# Patient Record
Sex: MALE | Race: OTHER
[De-identification: names, ages, dates, MRNs, and addresses within clinical notes are randomized per-mention and may not be internally consistent; named-entity substitution may affect disease eponyms.]

---

## 2020-03-18 ENCOUNTER — HOSPITAL ENCOUNTER (INPATIENT)
Dept: HOSPITAL 15 - ER | Age: 50
LOS: 9 days | Discharge: HOME | DRG: 951 | End: 2020-03-27
Attending: INTERNAL MEDICINE | Admitting: NURSE PRACTITIONER
Payer: MEDICAID

## 2020-03-18 VITALS — WEIGHT: 145.95 LBS | BODY MASS INDEX: 24.32 KG/M2 | HEIGHT: 65 IN

## 2020-03-18 VITALS — SYSTOLIC BLOOD PRESSURE: 99 MMHG | DIASTOLIC BLOOD PRESSURE: 59 MMHG

## 2020-03-18 DIAGNOSIS — J94.2: Primary | ICD-10-CM

## 2020-03-18 DIAGNOSIS — K72.90: ICD-10-CM

## 2020-03-18 DIAGNOSIS — K70.31: ICD-10-CM

## 2020-03-18 DIAGNOSIS — D68.9: ICD-10-CM
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
## 2020-03-18 DIAGNOSIS — K76.6: ICD-10-CM

## 2020-03-18 DIAGNOSIS — S21.101A: ICD-10-CM

## 2020-03-18 DIAGNOSIS — J98.11: ICD-10-CM

## 2020-03-18 DIAGNOSIS — E80.6: ICD-10-CM

## 2020-03-18 DIAGNOSIS — E87.1: ICD-10-CM
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
## 2020-03-18 DIAGNOSIS — D64.9: ICD-10-CM

## 2020-03-18 DIAGNOSIS — D69.6: ICD-10-CM

## 2020-03-18 DIAGNOSIS — Z79.899: ICD-10-CM

## 2020-03-18 DIAGNOSIS — K80.20: ICD-10-CM
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
## 2020-03-18 DIAGNOSIS — K70.30: ICD-10-CM

## 2020-03-18 DIAGNOSIS — J93.9: ICD-10-CM

## 2020-03-18 DIAGNOSIS — I95.9: ICD-10-CM

## 2020-03-18 DIAGNOSIS — D64.89: ICD-10-CM

## 2020-03-18 DIAGNOSIS — J96.00: ICD-10-CM

## 2020-03-18 DIAGNOSIS — N17.9: ICD-10-CM

## 2020-03-18 DIAGNOSIS — E44.0: ICD-10-CM

## 2020-03-18 DIAGNOSIS — I95.89: ICD-10-CM
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
## 2020-03-18 LAB
ALBUMIN SERPL-MCNC: 2.1 G/DL (ref 3.4–5)
ALP SERPL-CCNC: 214 U/L (ref 45–117)
ALT SERPL-CCNC: 34 U/L (ref 16–61)
ANION GAP SERPL CALCULATED.3IONS-SCNC: 7 MMOL/L (ref 5–15)
APTT PPP: 39.3 SEC (ref 23.64–32.05)
APTT PPP: 46.5 SEC (ref 23.64–32.05)
BILIRUB SERPL-MCNC: 6.1 MG/DL (ref 0.2–1)
BUN SERPL-MCNC: 12 MG/DL (ref 7–18)
BUN/CREAT SERPL: 19.7
CALCIUM SERPL-MCNC: 8.5 MG/DL (ref 8.5–10.1)
CHLORIDE SERPL-SCNC: 102 MMOL/L (ref 98–107)
CO2 SERPL-SCNC: 21 MMOL/L (ref 21–32)
GLUCOSE SERPL-MCNC: 82 MG/DL (ref 74–106)
HCT VFR BLD AUTO: 28 % (ref 41–53)
HCT VFR BLD AUTO: 33.2 % (ref 41–53)
HGB BLD-MCNC: 11.6 G/DL (ref 13.5–17.5)
HGB BLD-MCNC: 9.7 G/DL (ref 13.5–17.5)
INR PPP: 1.97 (ref 0.9–1.15)
INR PPP: 2.1 (ref 0.9–1.15)
LACTATE PLASV-SCNC: 2.8 MMOL/L (ref 0.4–2)
MAGNESIUM SERPL-MCNC: 2.3 MG/DL (ref 1.6–2.6)
MCH RBC QN AUTO: 36.7 PG (ref 28–32)
MCV RBC AUTO: 105 FL (ref 80–100)
NRBC BLD QL AUTO: 0.3 %
POTASSIUM SERPL-SCNC: 4.7 MMOL/L (ref 3.5–5.1)
PROT SERPL-MCNC: 7.3 G/DL (ref 6.4–8.2)
SODIUM SERPL-SCNC: 130 MMOL/L (ref 136–145)

## 2020-03-18 PROCEDURE — 87040 BLOOD CULTURE FOR BACTERIA: CPT

## 2020-03-18 PROCEDURE — 96365 THER/PROPH/DIAG IV INF INIT: CPT

## 2020-03-18 PROCEDURE — 86850 RBC ANTIBODY SCREEN: CPT

## 2020-03-18 PROCEDURE — 71045 X-RAY EXAM CHEST 1 VIEW: CPT

## 2020-03-18 PROCEDURE — 85027 COMPLETE CBC AUTOMATED: CPT

## 2020-03-18 PROCEDURE — 93005 ELECTROCARDIOGRAM TRACING: CPT

## 2020-03-18 PROCEDURE — 36415 COLL VENOUS BLD VENIPUNCTURE: CPT

## 2020-03-18 PROCEDURE — 86920 COMPATIBILITY TEST SPIN: CPT

## 2020-03-18 PROCEDURE — 80053 COMPREHEN METABOLIC PANEL: CPT

## 2020-03-18 PROCEDURE — 97116 GAIT TRAINING THERAPY: CPT

## 2020-03-18 PROCEDURE — 0XQ40ZZ REPAIR RIGHT AXILLA, OPEN APPROACH: ICD-10-PCS | Performed by: EMERGENCY MEDICINE

## 2020-03-18 PROCEDURE — 84484 ASSAY OF TROPONIN QUANT: CPT

## 2020-03-18 PROCEDURE — 71250 CT THORAX DX C-: CPT

## 2020-03-18 PROCEDURE — 85014 HEMATOCRIT: CPT

## 2020-03-18 PROCEDURE — 32551 INSERTION OF CHEST TUBE: CPT

## 2020-03-18 PROCEDURE — 84100 ASSAY OF PHOSPHORUS: CPT

## 2020-03-18 PROCEDURE — 86901 BLOOD TYPING SEROLOGIC RH(D): CPT

## 2020-03-18 PROCEDURE — 97530 THERAPEUTIC ACTIVITIES: CPT

## 2020-03-18 PROCEDURE — 85610 PROTHROMBIN TIME: CPT

## 2020-03-18 PROCEDURE — 85025 COMPLETE CBC W/AUTO DIFF WBC: CPT

## 2020-03-18 PROCEDURE — 97163 PT EVAL HIGH COMPLEX 45 MIN: CPT

## 2020-03-18 PROCEDURE — 96375 TX/PRO/DX INJ NEW DRUG ADDON: CPT

## 2020-03-18 PROCEDURE — 85730 THROMBOPLASTIN TIME PARTIAL: CPT

## 2020-03-18 PROCEDURE — 0W9930Z DRAINAGE OF RIGHT PLEURAL CAVITY WITH DRAINAGE DEVICE, PERCUTANEOUS APPROACH: ICD-10-PCS | Performed by: EMERGENCY MEDICINE

## 2020-03-18 PROCEDURE — 80048 BASIC METABOLIC PNL TOTAL CA: CPT

## 2020-03-18 PROCEDURE — 83605 ASSAY OF LACTIC ACID: CPT

## 2020-03-18 PROCEDURE — 96361 HYDRATE IV INFUSION ADD-ON: CPT

## 2020-03-18 PROCEDURE — 82140 ASSAY OF AMMONIA: CPT

## 2020-03-18 PROCEDURE — 82565 ASSAY OF CREATININE: CPT

## 2020-03-18 PROCEDURE — 85007 BL SMEAR W/DIFF WBC COUNT: CPT

## 2020-03-18 PROCEDURE — 87081 CULTURE SCREEN ONLY: CPT

## 2020-03-18 PROCEDURE — 86900 BLOOD TYPING SEROLOGIC ABO: CPT

## 2020-03-18 PROCEDURE — 76700 US EXAM ABDOM COMPLETE: CPT

## 2020-03-18 PROCEDURE — 71046 X-RAY EXAM CHEST 2 VIEWS: CPT

## 2020-03-18 PROCEDURE — 96379 UNL THER/PROP/DIAG INJ/INF: CPT

## 2020-03-18 PROCEDURE — 83735 ASSAY OF MAGNESIUM: CPT

## 2020-03-18 PROCEDURE — 85018 HEMOGLOBIN: CPT

## 2020-03-18 RX ADMIN — FUROSEMIDE SCH MG: 20 TABLET ORAL at 18:00

## 2020-03-18 RX ADMIN — Medication SCH ML: at 18:32

## 2020-03-18 RX ADMIN — MIDODRINE HYDROCHLORIDE SCH MG: 10 TABLET ORAL at 18:32

## 2020-03-18 RX ADMIN — SODIUM CHLORIDE SCH MG: 9 INJECTION, SOLUTION INTRAVENOUS at 22:04

## 2020-03-18 NOTE — NUR
Pt had leakage coming from his chest tube insertion point. Reinforced dressing, waiting for 
hospitalist.

## 2020-03-18 NOTE — NUR
Given report from day shift RN.

pt had drainage from chest tube at change of shift at 410. Will continue to monitor.

Pt received vitamin K, lasix was held do to low blood pressure, and chest tube is continuing 
to leak around the site. pts bed chucks are continually being saturated. Will continue to 
monitor. Pt B/P was 91/58 and hgb was 11.6. Pt ammonia level is 21. Will continue to 
monitor. Waiting for a call back from hospitalist to obtain orders for pt. 

Pt complains of site pain around the chest tube insertion. Pt in lowest possible position 
with bed rails up x2, call light within reach, pt encouraged to not move and call for any 
help.

## 2020-03-18 NOTE — NUR
MD Called/paged

Hospitalist paged to informed that pt got to the floor with rt chest tube draining blood 
from site, pressure dressing reinforced, lasix held due to low bp, pt's bp is 91/58, hr 66, 
awaiting call back.

## 2020-03-18 NOTE — NUR
Called to assess patient's chest tube and drainage.  Drainage is serous sanginous and normal 
amount for patient's diagnosis.  RN worried about drainage from site.  Unable to assess 
since RN just applied new dressings.  Will assess later when changed.  Patient's SBP is 80.  
Obtained orders from Richy Galloway NP for cxr and H&H to see where patient stands at this 
moment.  RN aware and inputting orders.

## 2020-03-18 NOTE — NUR
Hospitalist at bedside

DUY Galloway came to bedside. Assessed the patients chest tube. Took off all dressing, cleaned, 
and new dressing to be put on.  Ordered a stat PTPTT. NP to assess chart and update orders. 
Will continue to monitor pt.

## 2020-03-19 VITALS — DIASTOLIC BLOOD PRESSURE: 55 MMHG | SYSTOLIC BLOOD PRESSURE: 101 MMHG

## 2020-03-19 VITALS — DIASTOLIC BLOOD PRESSURE: 55 MMHG | SYSTOLIC BLOOD PRESSURE: 95 MMHG

## 2020-03-19 VITALS — SYSTOLIC BLOOD PRESSURE: 100 MMHG | DIASTOLIC BLOOD PRESSURE: 58 MMHG

## 2020-03-19 VITALS — DIASTOLIC BLOOD PRESSURE: 50 MMHG | SYSTOLIC BLOOD PRESSURE: 92 MMHG

## 2020-03-19 VITALS — SYSTOLIC BLOOD PRESSURE: 96 MMHG | DIASTOLIC BLOOD PRESSURE: 50 MMHG

## 2020-03-19 VITALS — DIASTOLIC BLOOD PRESSURE: 49 MMHG | SYSTOLIC BLOOD PRESSURE: 89 MMHG

## 2020-03-19 VITALS — SYSTOLIC BLOOD PRESSURE: 93 MMHG | DIASTOLIC BLOOD PRESSURE: 57 MMHG

## 2020-03-19 VITALS — DIASTOLIC BLOOD PRESSURE: 49 MMHG | SYSTOLIC BLOOD PRESSURE: 76 MMHG

## 2020-03-19 VITALS — DIASTOLIC BLOOD PRESSURE: 50 MMHG | SYSTOLIC BLOOD PRESSURE: 88 MMHG

## 2020-03-19 VITALS — DIASTOLIC BLOOD PRESSURE: 53 MMHG | SYSTOLIC BLOOD PRESSURE: 89 MMHG

## 2020-03-19 VITALS — DIASTOLIC BLOOD PRESSURE: 57 MMHG | SYSTOLIC BLOOD PRESSURE: 88 MMHG

## 2020-03-19 VITALS — DIASTOLIC BLOOD PRESSURE: 52 MMHG | SYSTOLIC BLOOD PRESSURE: 89 MMHG

## 2020-03-19 LAB
ALBUMIN SERPL-MCNC: 1.9 G/DL (ref 3.4–5)
ALP SERPL-CCNC: 126 U/L (ref 45–117)
ALT SERPL-CCNC: 29 U/L (ref 16–61)
ANION GAP SERPL CALCULATED.3IONS-SCNC: 4 MMOL/L (ref 5–15)
APTT PPP: 47.4 SEC (ref 23.64–32.05)
BILIRUB SERPL-MCNC: 7.7 MG/DL (ref 0.2–1)
BUN SERPL-MCNC: 18 MG/DL (ref 7–18)
BUN/CREAT SERPL: 29.5
CALCIUM SERPL-MCNC: 8.2 MG/DL (ref 8.5–10.1)
CHLORIDE SERPL-SCNC: 105 MMOL/L (ref 98–107)
CO2 SERPL-SCNC: 23 MMOL/L (ref 21–32)
GLUCOSE SERPL-MCNC: 109 MG/DL (ref 74–106)
HCT VFR BLD AUTO: 24.5 % (ref 41–53)
HGB BLD-MCNC: 8.5 G/DL (ref 13.5–17.5)
INR PPP: 2.07 (ref 0.9–1.15)
MCH RBC QN AUTO: 36.2 PG (ref 28–32)
MCV RBC AUTO: 104.6 FL (ref 80–100)
NRBC BLD QL AUTO: 0 %
POTASSIUM SERPL-SCNC: 5.1 MMOL/L (ref 3.5–5.1)
PROT SERPL-MCNC: 5.9 G/DL (ref 6.4–8.2)
SODIUM SERPL-SCNC: 132 MMOL/L (ref 136–145)

## 2020-03-19 PROCEDURE — 30233N1 TRANSFUSION OF NONAUTOLOGOUS RED BLOOD CELLS INTO PERIPHERAL VEIN, PERCUTANEOUS APPROACH: ICD-10-PCS | Performed by: EMERGENCY MEDICINE

## 2020-03-19 RX ADMIN — MIDODRINE HYDROCHLORIDE SCH MG: 10 TABLET ORAL at 05:41

## 2020-03-19 RX ADMIN — FUROSEMIDE SCH MG: 20 TABLET ORAL at 05:31

## 2020-03-19 RX ADMIN — Medication SCH ML: at 05:41

## 2020-03-19 RX ADMIN — HYDROMORPHONE HYDROCHLORIDE PRN MG: 2 INJECTION, SOLUTION INTRAMUSCULAR; INTRAVENOUS; SUBCUTANEOUS at 12:36

## 2020-03-19 RX ADMIN — SODIUM CHLORIDE SCH MLS/HR: 0.9 INJECTION, SOLUTION INTRAVENOUS at 12:37

## 2020-03-19 RX ADMIN — SODIUM CHLORIDE SCH MG: 9 INJECTION, SOLUTION INTRAVENOUS at 09:53

## 2020-03-19 RX ADMIN — Medication SCH ML: at 22:04

## 2020-03-19 RX ADMIN — SODIUM CHLORIDE SCH MG: 9 INJECTION, SOLUTION INTRAVENOUS at 22:04

## 2020-03-19 RX ADMIN — Medication SCH ML: at 00:00

## 2020-03-19 NOTE — NUR
BP reassessed and dressing looked at.

dressing is intact, no drainage noted at this time.

BP: 94/51 with a heart rate of 75

Will continue to monitor PRN

## 2020-03-19 NOTE — NUR
Opening Shift Note



Report received and rounding completed. Patient observed resting in bed, easily aroused and 
without current S/S of respiratory distress. No active bleeding from Chest tube site. 
Dressing intact, suction in place on LCS at 20cm, drainage in chest tube sanguineous marked 
at 620mL, normal tidaling with breathing, no reports of SOB at this time. Patient oriented 
to this RN and POC, verbalized understanding, and to call if needing assistance.

## 2020-03-19 NOTE — NUR
Plasma infusion started.

Vitals taken before the start

Temp: 98.2

HR: 80

Respirations: 16

O2: 100

BP: 88/50 (right)

## 2020-03-19 NOTE — NUR
Surgical Consult



Dr. Hickman called this RN asking about patient consult, gave recommendation for another unit 
of FFP.

-------------------------------------------------------------------------------

Addendum: 03/19/20 at 1249 by OLIVA CARRENO RN RN

-------------------------------------------------------------------------------

DR. Nohemy DAUGHERTY

## 2020-03-19 NOTE — NUR
Plasma transfusion ended

No adverse reaction noted. Pt is asleep. Vitals are as follows.

T: 98

HR: 76

Respirations: 12

O2: 100

B/P: 93/57

Will continue to monitor. 

Vital signs to be taken in one hour.

## 2020-03-19 NOTE — NUR
FFP transfusion complete



Fresh Frozen Plasma transfusion completed at 1714, no reaction assessed. BP 89/52, HR 83.

## 2020-03-19 NOTE — NUR
Closing shift

pt in lowest possible position with bed rails up x2 and call light within reach. Pt is 
asleep post plasma transfusion. Will endorse to day shift RN.

## 2020-03-19 NOTE — NUR
rounds



pt is resting in semi fowlers position with eyes closed.  no s/s of pain or discomfort.  
chest tube remains in place.  will continue to monitor.

## 2020-03-19 NOTE — NUR
opening note



pt is A&Ox4.  pt has chest tube in place, on low continuous suction.  respirations are even 
and nonlabored on 2.5L nc.  plan of care discussed with patient.  bed is in its lowest 
locked position, call light within reach.

## 2020-03-19 NOTE — NUR
Vitals taken 15 minutes after start of infusion

T: 97.8

HR: 76

Respiration: 14

O2: 100

B/P: 88/57

## 2020-03-19 NOTE — NUR
DUY Galloway ordered frozen plasma, type and screen drawn, will wait for BBK and continue to 
monitor.

## 2020-03-19 NOTE — NUR
MD paged



On call hospitalist, Dr. Augustin paged for diet order. Patient requesting water at this time.

## 2020-03-19 NOTE — NUR
Pt had a bowel movement and threw up x2. Will continue to monitor. Pt stated that he was not 
feeling nauseous and that it came out of nowhere.

## 2020-03-20 VITALS — DIASTOLIC BLOOD PRESSURE: 51 MMHG | SYSTOLIC BLOOD PRESSURE: 89 MMHG

## 2020-03-20 VITALS — SYSTOLIC BLOOD PRESSURE: 87 MMHG | DIASTOLIC BLOOD PRESSURE: 49 MMHG

## 2020-03-20 VITALS — DIASTOLIC BLOOD PRESSURE: 57 MMHG | SYSTOLIC BLOOD PRESSURE: 95 MMHG

## 2020-03-20 VITALS — DIASTOLIC BLOOD PRESSURE: 53 MMHG | SYSTOLIC BLOOD PRESSURE: 87 MMHG

## 2020-03-20 VITALS — SYSTOLIC BLOOD PRESSURE: 88 MMHG | DIASTOLIC BLOOD PRESSURE: 50 MMHG

## 2020-03-20 VITALS — SYSTOLIC BLOOD PRESSURE: 87 MMHG | DIASTOLIC BLOOD PRESSURE: 50 MMHG

## 2020-03-20 VITALS — SYSTOLIC BLOOD PRESSURE: 90 MMHG | DIASTOLIC BLOOD PRESSURE: 57 MMHG

## 2020-03-20 VITALS — DIASTOLIC BLOOD PRESSURE: 57 MMHG | SYSTOLIC BLOOD PRESSURE: 97 MMHG

## 2020-03-20 LAB
ANION GAP SERPL CALCULATED.3IONS-SCNC: 6 MMOL/L (ref 5–15)
BUN SERPL-MCNC: 14 MG/DL (ref 7–18)
BUN/CREAT SERPL: 26.4
CALCIUM SERPL-MCNC: 7.9 MG/DL (ref 8.5–10.1)
CHLORIDE SERPL-SCNC: 107 MMOL/L (ref 98–107)
CO2 SERPL-SCNC: 22 MMOL/L (ref 21–32)
GLUCOSE SERPL-MCNC: 116 MG/DL (ref 74–106)
HCT VFR BLD AUTO: 18.7 % (ref 41–53)
HCT VFR BLD AUTO: 19.7 % (ref 41–53)
HGB BLD-MCNC: 6.2 G/DL (ref 13.5–17.5)
HGB BLD-MCNC: 7 G/DL (ref 13.5–17.5)
MCH RBC QN AUTO: 36.3 PG (ref 28–32)
MCV RBC AUTO: 109.8 FL (ref 80–100)
POTASSIUM SERPL-SCNC: 4.1 MMOL/L (ref 3.5–5.1)
SODIUM SERPL-SCNC: 135 MMOL/L (ref 136–145)

## 2020-03-20 PROCEDURE — 30233K1 TRANSFUSION OF NONAUTOLOGOUS FROZEN PLASMA INTO PERIPHERAL VEIN, PERCUTANEOUS APPROACH: ICD-10-PCS | Performed by: EMERGENCY MEDICINE

## 2020-03-20 RX ADMIN — Medication SCH ML: at 21:03

## 2020-03-20 RX ADMIN — SODIUM CHLORIDE SCH MG: 9 INJECTION, SOLUTION INTRAVENOUS at 11:02

## 2020-03-20 RX ADMIN — SODIUM CHLORIDE SCH MLS/HR: 0.9 INJECTION, SOLUTION INTRAVENOUS at 16:15

## 2020-03-20 RX ADMIN — SODIUM CHLORIDE SCH MLS/HR: 0.9 INJECTION, SOLUTION INTRAVENOUS at 21:04

## 2020-03-20 RX ADMIN — HYDROMORPHONE HYDROCHLORIDE PRN MG: 2 INJECTION, SOLUTION INTRAMUSCULAR; INTRAVENOUS; SUBCUTANEOUS at 16:14

## 2020-03-20 RX ADMIN — Medication SCH ML: at 11:02

## 2020-03-20 RX ADMIN — HYDROMORPHONE HYDROCHLORIDE PRN MG: 2 INJECTION, SOLUTION INTRAMUSCULAR; INTRAVENOUS; SUBCUTANEOUS at 12:16

## 2020-03-20 RX ADMIN — HYDROMORPHONE HYDROCHLORIDE PRN MG: 2 INJECTION, SOLUTION INTRAMUSCULAR; INTRAVENOUS; SUBCUTANEOUS at 07:26

## 2020-03-20 RX ADMIN — SODIUM CHLORIDE SCH MLS/HR: 0.9 INJECTION, SOLUTION INTRAVENOUS at 02:00

## 2020-03-20 RX ADMIN — SODIUM CHLORIDE SCH MG: 9 INJECTION, SOLUTION INTRAVENOUS at 21:03

## 2020-03-20 NOTE — NUR
Pain,

Patient stated that he was starting to feel pain from his chest tube. Administered Dilauded 
per EMAR. Will continue to monitor.

## 2020-03-20 NOTE — NUR
pt resting comfortably with eyes closed.  respirations are even and non labored on 2.5L nc.  
no s/s of pain or discomfort at this time.  bed is in lowest locked position, call light 
within reach.

## 2020-03-20 NOTE — NUR
Critical Hemoglobin



Laboratory called and Bj BERNARD received critical value Hgb 6.2, on call hospitalist paged 
and immediate call back received. Dr. Galloway gave order for 1 unit PRBC.

## 2020-03-20 NOTE — NUR
Critical Hemoglobin



Laboratory called and gave hemoglobin 7.0 and hematocrit 19.7, Dr. Barclay paged with 
immediate call back. MD states no orders at this time and continue to monitor. Reported 
dressing change to CT site with clotted blood noted around CT insertion, no active bleeding. 
Patient also reporting itching at CT site where blood was dried, reported cleaning site and 
MD does not want to order any medication at this time.

## 2020-03-20 NOTE — NUR
Pain Reassessed

Reassessed patient for pain. Patient stated he felt 5/10 pain. He stated that he felt 
better. Will continue to monitor.

## 2020-03-20 NOTE — NUR
Rounding

Patient lying in bed resting with eyes closed. Respiration even and non-labored with no s/s 
of distress. Call light within reach. Bed lowered/locked with 2 side rails up. Will continue 
to monitor.

## 2020-03-20 NOTE — NUR
Opening shift note

Patient sitting up in bed A&Ox4. Respirations even and non-labored with no s/s of distress. 
Chest tube draining 60 mL serous fluid per night shift RN. Suction on low/20. NC at 3L. 
Patient aware of POC/blood transfusion. Bed in lowest/locked position with 2 side rails up, 
call light within reach. Will continue to monitor

## 2020-03-20 NOTE — NUR
closing note



pt resting comfortably with HOB elevated to 30 degrees.  pt denies any pain or discomfort.  
respirations are even and nonlabored on 2.5Lnc.  chest tube in place.  bed in low locked 
position, and call light is within reach.

## 2020-03-20 NOTE — NUR
Pain Reassess

Patient resting with eyes closed, respirations even and non-labored, no s/s of distress. 
Will continue to monitor.

## 2020-03-21 VITALS — SYSTOLIC BLOOD PRESSURE: 110 MMHG | DIASTOLIC BLOOD PRESSURE: 57 MMHG

## 2020-03-21 VITALS — SYSTOLIC BLOOD PRESSURE: 93 MMHG | DIASTOLIC BLOOD PRESSURE: 48 MMHG

## 2020-03-21 VITALS — SYSTOLIC BLOOD PRESSURE: 92 MMHG | DIASTOLIC BLOOD PRESSURE: 53 MMHG

## 2020-03-21 VITALS — DIASTOLIC BLOOD PRESSURE: 64 MMHG | SYSTOLIC BLOOD PRESSURE: 114 MMHG

## 2020-03-21 VITALS — SYSTOLIC BLOOD PRESSURE: 97 MMHG | DIASTOLIC BLOOD PRESSURE: 47 MMHG

## 2020-03-21 VITALS — DIASTOLIC BLOOD PRESSURE: 81 MMHG | SYSTOLIC BLOOD PRESSURE: 132 MMHG

## 2020-03-21 LAB
ANION GAP SERPL CALCULATED.3IONS-SCNC: 3 MMOL/L (ref 5–15)
APTT PPP: 48.7 SEC (ref 23.64–32.05)
BUN SERPL-MCNC: 10 MG/DL (ref 7–18)
BUN/CREAT SERPL: 23.8
CALCIUM SERPL-MCNC: 7.6 MG/DL (ref 8.5–10.1)
CHLORIDE SERPL-SCNC: 103 MMOL/L (ref 98–107)
CO2 SERPL-SCNC: 27 MMOL/L (ref 21–32)
GLUCOSE SERPL-MCNC: 115 MG/DL (ref 74–106)
HCT VFR BLD AUTO: 20 % (ref 41–53)
HGB BLD-MCNC: 7.1 G/DL (ref 13.5–17.5)
INR PPP: 1.99 (ref 0.9–1.15)
MCH RBC QN AUTO: 35.8 PG (ref 28–32)
MCV RBC AUTO: 101.1 FL (ref 80–100)
NRBC BLD QL AUTO: 0.1 %
POTASSIUM SERPL-SCNC: 4.1 MMOL/L (ref 3.5–5.1)
SODIUM SERPL-SCNC: 133 MMOL/L (ref 136–145)

## 2020-03-21 RX ADMIN — SODIUM CHLORIDE SCH MG: 9 INJECTION, SOLUTION INTRAVENOUS at 09:49

## 2020-03-21 RX ADMIN — SODIUM CHLORIDE SCH MLS/HR: 0.9 INJECTION, SOLUTION INTRAVENOUS at 22:27

## 2020-03-21 RX ADMIN — HYDROMORPHONE HYDROCHLORIDE PRN MG: 2 INJECTION, SOLUTION INTRAMUSCULAR; INTRAVENOUS; SUBCUTANEOUS at 09:49

## 2020-03-21 RX ADMIN — Medication SCH ML: at 09:49

## 2020-03-21 RX ADMIN — Medication SCH ML: at 20:56

## 2020-03-21 RX ADMIN — HYDROMORPHONE HYDROCHLORIDE PRN MG: 2 INJECTION, SOLUTION INTRAMUSCULAR; INTRAVENOUS; SUBCUTANEOUS at 20:57

## 2020-03-21 RX ADMIN — SODIUM CHLORIDE SCH MG: 9 INJECTION, SOLUTION INTRAVENOUS at 20:56

## 2020-03-21 RX ADMIN — SODIUM CHLORIDE SCH MLS/HR: 0.9 INJECTION, SOLUTION INTRAVENOUS at 17:17

## 2020-03-21 NOTE — NUR
Pain reassessment

Pain has been relieved after medication. Pain is now a 2/10. No distress noted and patient 
has been resting off and on with eyes closed.

## 2020-03-21 NOTE — NUR
TEMP



PATIENT'S ORAL TEMP 99.4 F. COOLING MEASURES INITIATED. EDUCATED PATIENT ON 
INDICATION/REASON FOR COOLING MEASURES, PATIENT VERBALIZED UNDERSTANDING AND IN AGREEMENT. 
WILL CONTINUE TO MONITOR.

## 2020-03-21 NOTE — NUR
Opening Shift Note

Assumed care of patient, AOx4. No S/S of distress/SOB or pain. Fall and safety precautions 
in place. Chest tube in place with collection chamber standing upright on floor hooked up to 
low continuous suction. Tubing is found to have kinks. Tubing straightened out at this time 
and is now draining sanguinous fluid. Call light within reach and able to use. Instructed on 
POC and to call for assist PRN, patient verbalized understanding and in agreement. Will 
continue to monitor for changes Q1hr and PRN.

## 2020-03-21 NOTE — NUR
Pain

Patient is complaining of pain to chest tube insertion site that he rates at 8/10. Will 
medicate according to doctor's orders.

## 2020-03-21 NOTE — NUR
ENDORSED PATIENT TO NOC RN. PATIENT IS ASLEEP. NO DISTRESS NOTED. CHEST TUBE IS INTACT. NO 
CONTINUOS BUBBLING NOTED. NO SUBCUTANEOUS EMPHYSEMA AROUND THE CT SITE. RO FOLLOW CT 
DRESSING. NOTED.

## 2020-03-21 NOTE — NUR
IV insertion

IV access obtained, via clean sterile technique by inserting 20 gauge catheter at RIGHT HAND 
at 1 attempt. IV secured properly. No trauma to site. Patient tolerated well.

## 2020-03-21 NOTE — NUR
Opening Shift Note

Assuming care of patient at this time. Patient is awake and alert. Patient denies pain. 
Patient shows no signs or symptoms of distress or shortness of breath. Bed is locked and 
lowered with side rails up x2. Instructed patient on the plan of care for today and to call 
for assistance as needed. Call light within reach. Will continue to round hourly and as 
needed.

## 2020-03-21 NOTE — NUR
Patient is now awake, alert and oriented x 4, right chest tube in place to low continuous 
wall suction, chest tube site with sanguinous drainage on dressing, tender to touch.

## 2020-03-21 NOTE — NUR
IV removed



Patient found to have blood on linens from left ac IV pulled out. Patient states he did not 
intentionally pull IV out. Catheter fully intact. Pressure dressing applied to site, patient 
tolerated well. Will continue to monitor.

## 2020-03-22 VITALS — SYSTOLIC BLOOD PRESSURE: 96 MMHG | DIASTOLIC BLOOD PRESSURE: 54 MMHG

## 2020-03-22 VITALS — SYSTOLIC BLOOD PRESSURE: 97 MMHG | DIASTOLIC BLOOD PRESSURE: 56 MMHG

## 2020-03-22 VITALS — DIASTOLIC BLOOD PRESSURE: 52 MMHG | SYSTOLIC BLOOD PRESSURE: 95 MMHG

## 2020-03-22 VITALS — SYSTOLIC BLOOD PRESSURE: 107 MMHG | DIASTOLIC BLOOD PRESSURE: 60 MMHG

## 2020-03-22 VITALS — DIASTOLIC BLOOD PRESSURE: 49 MMHG | SYSTOLIC BLOOD PRESSURE: 96 MMHG

## 2020-03-22 RX ADMIN — Medication SCH ML: at 09:14

## 2020-03-22 RX ADMIN — HYDROMORPHONE HYDROCHLORIDE PRN MG: 2 INJECTION, SOLUTION INTRAMUSCULAR; INTRAVENOUS; SUBCUTANEOUS at 15:54

## 2020-03-22 RX ADMIN — SODIUM CHLORIDE SCH MLS/HR: 0.9 INJECTION, SOLUTION INTRAVENOUS at 15:59

## 2020-03-22 RX ADMIN — PANTOPRAZOLE SODIUM SCH MG: 40 INJECTION, POWDER, FOR SOLUTION INTRAVENOUS at 21:11

## 2020-03-22 RX ADMIN — Medication SCH ML: at 21:11

## 2020-03-22 RX ADMIN — SODIUM CHLORIDE SCH MLS/HR: 0.9 INJECTION, SOLUTION INTRAVENOUS at 21:11

## 2020-03-22 RX ADMIN — SODIUM CHLORIDE SCH MG: 9 INJECTION, SOLUTION INTRAVENOUS at 09:13

## 2020-03-22 RX ADMIN — SODIUM CHLORIDE SCH MLS/HR: 0.9 INJECTION, SOLUTION INTRAVENOUS at 06:43

## 2020-03-22 RX ADMIN — SODIUM CHLORIDE SCH MLS/HR: 0.9 INJECTION, SOLUTION INTRAVENOUS at 05:36

## 2020-03-22 RX ADMIN — VANCOMYCIN HYDROCHLORIDE SCH MLS/HR: 1 INJECTION, POWDER, LYOPHILIZED, FOR SOLUTION INTRAVENOUS at 23:49

## 2020-03-22 RX ADMIN — HYDROMORPHONE HYDROCHLORIDE PRN MG: 2 INJECTION, SOLUTION INTRAMUSCULAR; INTRAVENOUS; SUBCUTANEOUS at 03:09

## 2020-03-22 RX ADMIN — VANCOMYCIN HYDROCHLORIDE SCH MLS/HR: 1 INJECTION, POWDER, LYOPHILIZED, FOR SOLUTION INTRAVENOUS at 23:18

## 2020-03-22 RX ADMIN — SODIUM CHLORIDE SCH MLS/HR: 0.9 INJECTION, SOLUTION INTRAVENOUS at 20:15

## 2020-03-22 NOTE — NUR
CHEST TUBE COLLECTION CHAMBER CHANGE



WITH ADDITIONAL 2 CHARGE RN STAFF, COLLECTION CHAMBER OF CHEST TUBE CHANGED AT THIS TIME. 
PATIENT TOLERATED WELL, NO COMPLICATIONS. HOOKED UP TO LOW CONTINUOUS SUCTION AND TUBING 
FREE OF KINKS. ALL CONNECTIONS CHECKED. WILL CONTINUE TO MONITOR.

-------------------------------------------------------------------------------

Addendum: 03/23/20 at 0536 by JOHNNY LAY RN RN

-------------------------------------------------------------------------------

TOTAL OUTPUT 1200 ML OF SANGUINOUS FLUID.

## 2020-03-22 NOTE — NUR
Opening Shift Note / Pain assessment

Assumed care of patient, awake and alert. Fall and safety precautions in place. Call light 
within reach and able to use. No S/S of distress/SOB. Patient noted to have chest tube 
draining sanguinous fluid appropriately into receiving collection chamber with air gap in 
collection chamber, will change at later time; made charge RN aware. Patient is complaining 
of 10/10 pain, using adult pain scale, to right side of chest at chest tube insertion site 
area. Patient's blood pressure taken 100/54 mm Hg, heart rate 83bpm. Patient instructed that 
available medication for severe pain can decrease blood pressure, and discussed with patient 
other options for pain relief at this time. Patient verbalized understanding and in 
agreement to receive other option for pain relief via medication (ee eMar for 
administration). Patient instructed that reassessment will be done and to inform this RN if 
pain does not subside. Patient verbalized understanding and in agreement. Additionally, 
instructed patient on his plan of care and to call for assist PRN, patient verbalized 
understanding and in agreement. Will continue to monitor for changes Q1hr and PRN.

## 2020-03-22 NOTE — NUR
Assessed patients chest tube canister. First chamber noted to have 200ml of serosanguineous 
fluid. Second chamber noted to have  950ml of serosanguineous fluid with 150mls of air space 
noted. Third chamber noted to have 1400ml of serosanguineous fluid. Will continue to monitor 
through out my shift.

## 2020-03-22 NOTE — NUR
SHIFT CHEST TUBE OUTPUT



250ml sanguinous output via collection chamber of chest tube during this RN shift. Tubing is 
free of kinks and draining appropriately. Pressure dressing is clean/dry/intact.

## 2020-03-22 NOTE — NUR
RECEIVED CALL BACK



ON-CALL HOSPITALIST CALLED BACK AND UPDATED ON PATIENT STATUS REGARDING LOW HGB. INFORMED 
THIS RN NOT TO INFUSE AT THIS TIME AND TO WAIT FOR NEW AM LABS, STATING "IF IT DROPS IN THE 
MORNING, THEN WE'LL TRANSFUSE." WILL CONTINUE TO MONITOR.

## 2020-03-22 NOTE — NUR
ON-CALL HOSP CALL-BACK



ON-CALL HOSP UPDATED ON PATIENT STATUS. NEW ORDERS RECEIVED TO ORDER PULM CONSULT AND REMOVE 
THEN CHANGE DRESSING, WILL CARRY OUT.

## 2020-03-22 NOTE — NUR
closing shift note

care of patient endorsed to NOC JOANA Schuster. No s/s of pain, distress, or sob at this time.

## 2020-03-22 NOTE — NUR
PATIENT'S CHEST TUBE DRESSING C/D/I. TUBING IS BELOW CHEST LEVEL AND FREE OF 
KINKS/OCCLUSIONS AND DRAINING APPROPRIATELY. NO CREPITUS ON PALPATION. NO BUBBLING IN WATER 
SEAL CHAMBER. HOOKED UP TO LOW CONTINUOUS SUCTION. WILL CONTINUE TO MONITOR.

## 2020-03-22 NOTE — NUR
Chest tube output for day shift was 30ml of serosanguineous fluid. No air leaks noted, chest 
tube is patent and draining, dressings are clean, dry and intact.

## 2020-03-22 NOTE — NUR
CHARGE RN AWARE OF PATIENT STATUS



CHARGE RN STATES FOR THIS RN NOT TO REMOVE AND CHANGE DRESSING AT THIS TIME. CHARGE RN 
STATES SHE WILL COME TO CHANGE DRESSING. WILL CONTINUE TO  MONITOR.

## 2020-03-22 NOTE — NUR
Patient has large BM



Patient has BM at this time: brown in color, large in size, and loose in consistency. 
Patient linens changed and repositioned for comfort. Will continue to monitor.

## 2020-03-22 NOTE — NUR
MD GRIFFIN AT BEDSIDE

UPDATED MD ON PATIENT'S STATUS INCLUDING CHEST TUBE BLEEDING ON NIGHT SHIFT. MD ASSESSED 
CHEST TUBE AND ASSESSED FOR AIR LEAKS. NO AR LEAKS AT THIS TIME. WILL CONTINUE TO MONITOR.

## 2020-03-22 NOTE — NUR
PAIN REASSESSMENT



USING ADULT PAIN SCALE, PATIENT RATES HIS PAIN 2/10 AND STATES PAIN IS TOLERABLE AND DOES 
NOT NEED FURTHER PAIN MEDICATION. PATIENT INSTRUCTED THIS RN TO INFORM OF ANY CHANGES, 
PATIENT VERBALIZED UNDERSTANDING AND IN AGREEMENT WILL CONTINUE TO MONITOR.

## 2020-03-22 NOTE — NUR
Opening shift note

Assumed care of patient from NOC Rn. Patient is AOx4 exhibiting no S/S of SOB, pain, or 
distress. Patients chest tube is patent, draining, intact and dressing is clean, dry and 
intact. Bed is in lowest locked position with side rails up x2, call light within reach. 
Updated patient on plan of care, patient verbalized understanding. I will continue to 
monitor q1hr and PRN.

## 2020-03-22 NOTE — NUR
MD spoke with patient about plan of care. Informed MD that labs were last drawn on 3-21-20, 
no new orders were given.

## 2020-03-22 NOTE — NUR
HGB 7.1 ON-CALL HOSP PAGED



HGB ON 3/21 NOTED TO BE 7.1. NO ACTIVE ORDERS TO TRANSFUSE AT THIS TIME. ON-CALL HOSP PAGED 
AT THIS TIME. AWAITING CALL BACK. WILL CONTINUE TO MONITOR PATIENT.

## 2020-03-23 VITALS — DIASTOLIC BLOOD PRESSURE: 51 MMHG | SYSTOLIC BLOOD PRESSURE: 97 MMHG

## 2020-03-23 VITALS — SYSTOLIC BLOOD PRESSURE: 94 MMHG | DIASTOLIC BLOOD PRESSURE: 47 MMHG

## 2020-03-23 VITALS — SYSTOLIC BLOOD PRESSURE: 93 MMHG | DIASTOLIC BLOOD PRESSURE: 47 MMHG

## 2020-03-23 VITALS — SYSTOLIC BLOOD PRESSURE: 92 MMHG | DIASTOLIC BLOOD PRESSURE: 48 MMHG

## 2020-03-23 VITALS — SYSTOLIC BLOOD PRESSURE: 94 MMHG | DIASTOLIC BLOOD PRESSURE: 50 MMHG

## 2020-03-23 LAB
ALBUMIN SERPL-MCNC: 1.7 G/DL (ref 3.4–5)
ALP SERPL-CCNC: 166 U/L (ref 45–117)
ALT SERPL-CCNC: 26 U/L (ref 16–61)
ANION GAP SERPL CALCULATED.3IONS-SCNC: 6 MMOL/L (ref 5–15)
APTT PPP: 55.1 SEC (ref 23.64–32.05)
BILIRUB SERPL-MCNC: 4.1 MG/DL (ref 0.2–1)
BUN SERPL-MCNC: 11 MG/DL (ref 7–18)
BUN/CREAT SERPL: 16.2
CALCIUM SERPL-MCNC: 7.3 MG/DL (ref 8.5–10.1)
CHLORIDE SERPL-SCNC: 102 MMOL/L (ref 98–107)
CO2 SERPL-SCNC: 24 MMOL/L (ref 21–32)
GLUCOSE SERPL-MCNC: 104 MG/DL (ref 74–106)
HCT VFR BLD AUTO: 21 % (ref 41–53)
HGB BLD-MCNC: 7.3 G/DL (ref 13.5–17.5)
INR PPP: 2.1 (ref 0.9–1.15)
MAGNESIUM SERPL-MCNC: 2.2 MG/DL (ref 1.6–2.6)
MCH RBC QN AUTO: 36.4 PG (ref 28–32)
MCV RBC AUTO: 105.3 FL (ref 80–100)
NRBC BLD QL AUTO: 0.1 %
POTASSIUM SERPL-SCNC: 4.2 MMOL/L (ref 3.5–5.1)
PROT SERPL-MCNC: 5.1 G/DL (ref 6.4–8.2)
SODIUM SERPL-SCNC: 132 MMOL/L (ref 136–145)

## 2020-03-23 RX ADMIN — PANTOPRAZOLE SODIUM SCH MG: 40 INJECTION, POWDER, FOR SOLUTION INTRAVENOUS at 09:52

## 2020-03-23 RX ADMIN — SODIUM CHLORIDE SCH MLS/HR: 0.9 INJECTION, SOLUTION INTRAVENOUS at 05:20

## 2020-03-23 RX ADMIN — PANTOPRAZOLE SODIUM SCH MG: 40 INJECTION, POWDER, FOR SOLUTION INTRAVENOUS at 21:24

## 2020-03-23 RX ADMIN — HYDROMORPHONE HYDROCHLORIDE PRN MG: 2 INJECTION, SOLUTION INTRAMUSCULAR; INTRAVENOUS; SUBCUTANEOUS at 16:24

## 2020-03-23 RX ADMIN — Medication SCH ML: at 09:50

## 2020-03-23 RX ADMIN — Medication SCH ML: at 21:24

## 2020-03-23 RX ADMIN — VANCOMYCIN HYDROCHLORIDE SCH MLS/HR: 1 INJECTION, POWDER, LYOPHILIZED, FOR SOLUTION INTRAVENOUS at 09:50

## 2020-03-23 NOTE — NUR
Opening Shift Note

Assumed care of patient, awake and alert. No S/S of distress/SOB or pain. Fall and safety 
precautions in place. Call light within reach and able to use. Right side chest tube 
draining well into collection chamber, draining appropriately. All connections checked. No 
crepitus on palpation. Respirations unlabored. Instructed on POC and to call for assist PRN, 
will continue to monitor for changes Q1hr and PRN.

## 2020-03-23 NOTE — NUR
CHEST TUBE DRESSING C/D/I. TUBING BELOW CHEST LEVEL; FREE OF KINKS/OCCLUSIONS. NO CREPITUS 
UPON PALPATION. NO BUBBLING IN WATER SEAL CHAMBER. HOOKED UP TO LOW CONTINUOUS SUCTION. WILL 
CONTINUE TO MONITOR.

## 2020-03-23 NOTE — NUR
OPENING SHIFT NOTE

Assumed care of patient from NOC RN Mariely. Patient exhibiting no S/S or SOB, pain or 
distress at this time. Chest tube is intact, patent, and draining. Dressings are clean dry 
and intact. Bed is in lowest locked position, side rails up x2, call light is with in reach. 
Updated patient on plan of care, patient verbalized understanding. will continue to monitor 
Q1hr and PRN.

## 2020-03-23 NOTE — NUR
Helped patient from chair to bed. His chest tube is intact, patent, draining serosanguineous 
fluid and dressings are clean dry and intact.

## 2020-03-23 NOTE — NUR
PATIENT NOTED TO HAVE BLOOD AT CHEST TUBE INSERTION SITE. MD IS ALREADY AWARE. WILL CONTINUE 
TO MONITOR PATIENT. 16 4X4 GAUZE PADS AND 2 ABDOMINAL PADS COMPLETELY SATURATED IN 
SANGUINOUS BLOOD. WILL CONTINUE TO MONITOR.

## 2020-03-23 NOTE — NUR
NUTRITION ASSESSMENT NOTES



Please refer to link notes of nutrition screen form filed under the intervention section of 
the plan of care for further details.



Est. Energy Needs: 8044-9813 kcal (25-30 kcal/kg BW).

Est. Protein Needs: 50-63 gms/day (0.8-1.0 gms/kg BW). 



Will continue to monitor pertinent labs and reassess nutrient need prn

-------------------------------------------------------------------------------

Addendum: 03/23/20 at 1603 by LACHO CHO RD

-------------------------------------------------------------------------------

Amended: Links added.

## 2020-03-23 NOTE — NUR
Closing shift note. 

Care of patient endorsed to night shift JOANA Schuster. Patient not exhibiting any s/s of sob, 
pain, or distress at this time.

## 2020-03-23 NOTE — NUR
Physical therapy at bedside. Per physical therapy patient walked around the unit with walker 
and tolerated it well. Patient currently up in chair exhibiting no s/s of pain, sob or 
distress at this time.

## 2020-03-23 NOTE — NUR
Ultrasound:



Spoke to Ultrasound staff member, stated that due to INR, patient cannot have ultrasound at 
this time. Will notify day shift RN to follow up with MD if patient is to have procedure and 
to follow up with Ultrasound on decision.

## 2020-03-23 NOTE — NUR
Chest tube output for day shift was 100 ml of serosanguineous fluid. No air leaks noted, 
chest tube is patent and draining, dressings are clean, dry and intact.

## 2020-03-23 NOTE — NUR
END OF SHIFT:



TOTAL OUTPUT 55 ML OF SANGUINOUS FLUID COLLECTED IN CHAMBER. 

IN ADDITION, 16 4X4 GAUZE PADS AND 2 ABDOMINAL PADS WERE FULLY SATURATED OF SANGUINOUS FLUID 
AROUND INSERTION SITE. 

NO AIR LEAKS. 

PATIENT'S CHEST TUBE DRESSING IS C/D/I. 

TUBING BELOW CHEST LEVEL AND FREE OF KINKS/OCCLUSIONS AND DRAINING APPROPRIATELY. 

NO CREPITUS ON PALPATION. 

RESPIRATIONS 16 BPM, DECREASED TO ABSENT ON RIGHT SIDE. 

NO BUBBLING IN WATER SEAL CHAMBER. 

HOOKED UP TO LOW CONTINUOUS SUCTION. 

WILL ENDORSE CARE TO DAY SHIFT JOANA NATION.

## 2020-03-23 NOTE — NUR
PATIENT'S HGB TODAY IS 7.3. NO ACTIVE ORDERS TO TRANSFUSE. PER ON-CALL HOSP, VALUE DID NOT 
DECREASE FROM PREVIOUS VALUE AND NOT TO TRANSFUSE AT THIS TIME. WILL CONTINUE TO MONITOR.

## 2020-03-24 VITALS — DIASTOLIC BLOOD PRESSURE: 56 MMHG | SYSTOLIC BLOOD PRESSURE: 103 MMHG

## 2020-03-24 VITALS — DIASTOLIC BLOOD PRESSURE: 54 MMHG | SYSTOLIC BLOOD PRESSURE: 94 MMHG

## 2020-03-24 VITALS — SYSTOLIC BLOOD PRESSURE: 93 MMHG | DIASTOLIC BLOOD PRESSURE: 55 MMHG

## 2020-03-24 VITALS — DIASTOLIC BLOOD PRESSURE: 54 MMHG | SYSTOLIC BLOOD PRESSURE: 95 MMHG

## 2020-03-24 VITALS — SYSTOLIC BLOOD PRESSURE: 101 MMHG | DIASTOLIC BLOOD PRESSURE: 57 MMHG

## 2020-03-24 LAB
HCT VFR BLD AUTO: 22 % (ref 41–53)
HGB BLD-MCNC: 7.7 G/DL (ref 13.5–17.5)
MCH RBC QN AUTO: 36.4 PG (ref 28–32)
MCV RBC AUTO: 104.4 FL (ref 80–100)
NRBC BLD QL AUTO: 0 %

## 2020-03-24 RX ADMIN — SPIRONOLACTONE SCH MG: 25 TABLET, FILM COATED ORAL at 18:22

## 2020-03-24 RX ADMIN — PANTOPRAZOLE SODIUM SCH MG: 40 INJECTION, POWDER, FOR SOLUTION INTRAVENOUS at 10:54

## 2020-03-24 RX ADMIN — Medication SCH ML: at 10:55

## 2020-03-24 RX ADMIN — CEFTRIAXONE SODIUM SCH MLS/HR: 1 INJECTION, POWDER, FOR SOLUTION INTRAMUSCULAR; INTRAVENOUS at 10:54

## 2020-03-24 RX ADMIN — Medication SCH ML: at 22:01

## 2020-03-24 NOTE — NUR
Opening Shift Note

Assumed care of patient, awake, alert and oriented X4. No S/S of distress/SOB or pain. Tele# 
11, sinus rhythm @ 72 bpm. IV to right hand, 20 gauge, patent and infusing 0.9% NS @ 75 
ml/hr. Right posterior, lateral flank chest tube hooked to water seal, with serosanguineous 
drainage noted in Pleura Vac, dressing intact. Instructed on POC and to call for assist PRN, 
verbalized understanding. Bed locked, in lowest position, call light within reach, will 
continue to monitor for changes Q1hr and PRN.

## 2020-03-24 NOTE — NUR
assessment

Patient is a 49 year old male who is alert and oriented. Prior to admission patient lived 
home with family and functioned independently. Patient informed me he is able to care for 
his own ADLs.  Per patient he will return home to his prior living arrangements post 
discharge and family will transport him home. Patient has a chest tube. Patient may need 
home health for chest tube on discharge. I will evaluate patients needs as needed. I 
informed patient he has a right to speak to a  regarding all care. I informed 
patient he has a right to participate in any and all discharge planning.  Patient does not 
have a POA and advanced directive. I have offered patient information on POA and advanced 
directives. I informed the patient the advantages and benefits of having an Advanced 
Directive. Patient verbalized understanding and agreed to discharge plan.

-------------------------------------------------------------------------------

Addendum: 03/24/20 at 1509 by Huong PUGH

-------------------------------------------------------------------------------

Amended: Links added.

## 2020-03-24 NOTE — NUR
ROUNDS

Dr Barclay at bedside for rounds, new orders received and followed through. Patient updated 
on plan of care, verbalized understanding.

## 2020-03-24 NOTE — NUR
Ending note / total ouput for shift:



40ml of sero to sanguinous fluid. No air leaks. No respiratory distress. Oxygen saturation 
>92%. Pain reassessment 1/10 to right chest tube insertion site using adult scale. Patient 
states he is at tolerable pain level. Dressing noted to have some blood around site 
underneath secondary dressing. MD aware, charge RN aware. Will notify day shift RN.

## 2020-03-24 NOTE — NUR
OPENING SHIFT NOTE

Assumed care of patient who is A&O x4. Currently on RA with no s/s of distress or SOB. 
Reports 6/10 pain to left lower abdomen. Pain management options discussed. Chest tube in 
right flank intact. Dressing is CDI, tubing is free from kinks, no air leak noted. Draining 
serosanguineous fluid. POC discussed and all questions answered. Bed is in low locked 
position with side rails up x2. Call light is within reach and patient encouraged to call 
for assistance when needed. Will continue to monitor for changes PRN.

## 2020-03-24 NOTE — NUR
Pain Assessment



Patient states he has 7/10 pain to right chest at insertion site of chest tube. Patient 
states he is okay with taking medication available that provides pain relief, being aware of 
latest blood pressure and caution to prevent further decrease in vitals. See emar for 
administration. Will continue to monitor.

## 2020-03-24 NOTE — NUR
PULMONARY

Dr Gutierrez at bedside for Pulmonology follow up, no new orders received at this time. Patient 
updated on plan of care, verbalized understanding.

## 2020-03-25 VITALS — SYSTOLIC BLOOD PRESSURE: 93 MMHG | DIASTOLIC BLOOD PRESSURE: 50 MMHG

## 2020-03-25 VITALS — SYSTOLIC BLOOD PRESSURE: 92 MMHG | DIASTOLIC BLOOD PRESSURE: 55 MMHG

## 2020-03-25 VITALS — DIASTOLIC BLOOD PRESSURE: 60 MMHG | SYSTOLIC BLOOD PRESSURE: 111 MMHG

## 2020-03-25 VITALS — DIASTOLIC BLOOD PRESSURE: 54 MMHG | SYSTOLIC BLOOD PRESSURE: 98 MMHG

## 2020-03-25 VITALS — SYSTOLIC BLOOD PRESSURE: 95 MMHG | DIASTOLIC BLOOD PRESSURE: 57 MMHG

## 2020-03-25 LAB
HCT VFR BLD AUTO: 22.6 % (ref 41–53)
HGB BLD-MCNC: 7.8 G/DL (ref 13.5–17.5)
MCH RBC QN AUTO: 36.2 PG (ref 28–32)
MCV RBC AUTO: 105.5 FL (ref 80–100)

## 2020-03-25 RX ADMIN — Medication SCH ML: at 10:24

## 2020-03-25 RX ADMIN — Medication SCH ML: at 21:42

## 2020-03-25 RX ADMIN — SPIRONOLACTONE SCH MG: 25 TABLET, FILM COATED ORAL at 05:39

## 2020-03-25 RX ADMIN — SPIRONOLACTONE SCH MG: 25 TABLET, FILM COATED ORAL at 17:35

## 2020-03-25 RX ADMIN — FUROSEMIDE SCH MG: 20 TABLET ORAL at 10:00

## 2020-03-25 RX ADMIN — CEFTRIAXONE SODIUM SCH MLS/HR: 1 INJECTION, POWDER, FOR SOLUTION INTRAMUSCULAR; INTRAVENOUS at 09:32

## 2020-03-25 NOTE — NUR
ROUNDS

Dr Barclay at bedside for rounds. No new orders received at this time. Patient updated on 
plan of care, verbalized understanding. Translation provided by JOANA Lockhart.

## 2020-03-25 NOTE — NUR
Refusal

Patient refused 2200 dose of Lactulose. Patient educated on the indication of this 
medication, however still declines to take it.

## 2020-03-25 NOTE — NUR
PAIN

Patient reports 10/10 pain r/t chest tube insertion site. Tramadol 50mg administered, rather 
than Dilaudid 1mg due to BP of 93/50. Chest tube dressing is CDI. No kinks in tubing noted. 
Draining serosanguineous fluid.

## 2020-03-25 NOTE — NUR
OPENING SHIFT NOTE

Assumed care of patient who is A&O x4. Currently on RA with no s/s of distress or SOB. Chest 
tube in place at right flank connected to H2O seal. Tidaling present. Dressing is CDI. 
Tubing is free from kinks. Patient reports 5/10 "hot" pain in lower abdomen. Pain management 
options discussed. Bed is in low locked position with side rails up x2. Call light is within 
reach and patient encouraged to call for assistance when needed.

## 2020-03-25 NOTE — NUR
Opening shift note

Assumed care of patient. Patient A&Ox4, respirations even and non-labored with no s/s of 
distress. Chest tube connections checked, dressing clean, dry and intact. Chest tube 
drainage tidaling with no sign of air leaks. Noted 20 mL of serosanguinous fluid drained 
from last night shift genoveva. IV flushed, patent and intact. Bed lowered/locked with 2 side 
rails up. Call light within reach. Advised patient to call for assistance. Will continue to 
monitor.

## 2020-03-26 VITALS — SYSTOLIC BLOOD PRESSURE: 91 MMHG | DIASTOLIC BLOOD PRESSURE: 58 MMHG

## 2020-03-26 VITALS — DIASTOLIC BLOOD PRESSURE: 50 MMHG | SYSTOLIC BLOOD PRESSURE: 91 MMHG

## 2020-03-26 VITALS — DIASTOLIC BLOOD PRESSURE: 55 MMHG | SYSTOLIC BLOOD PRESSURE: 105 MMHG

## 2020-03-26 VITALS — DIASTOLIC BLOOD PRESSURE: 55 MMHG | SYSTOLIC BLOOD PRESSURE: 96 MMHG

## 2020-03-26 VITALS — SYSTOLIC BLOOD PRESSURE: 88 MMHG | DIASTOLIC BLOOD PRESSURE: 50 MMHG

## 2020-03-26 RX ADMIN — FUROSEMIDE SCH MG: 20 TABLET ORAL at 09:28

## 2020-03-26 RX ADMIN — SPIRONOLACTONE SCH MG: 25 TABLET, FILM COATED ORAL at 18:00

## 2020-03-26 RX ADMIN — CEFTRIAXONE SODIUM SCH MLS/HR: 1 INJECTION, POWDER, FOR SOLUTION INTRAMUSCULAR; INTRAVENOUS at 09:32

## 2020-03-26 RX ADMIN — PANTOPRAZOLE SODIUM SCH MG: 40 TABLET, DELAYED RELEASE ORAL at 09:32

## 2020-03-26 RX ADMIN — Medication SCH ML: at 22:06

## 2020-03-26 RX ADMIN — Medication SCH ML: at 09:28

## 2020-03-26 RX ADMIN — SPIRONOLACTONE SCH MG: 25 TABLET, FILM COATED ORAL at 05:42

## 2020-03-26 NOTE — NUR
PT

Patient ambulating in hallway with physical therapy. Patient tolerated well. Will continue 
to monitor Q1 hour and PRN.

## 2020-03-26 NOTE — NUR
Opening Note

Received report from night shift RN. Patient is awake, alert and oriented x4. No signs or 
symptoms of distress noted at this time. Patient denies pain at this time. Patient has chest 
tube to right flank, draining and free of kinks. Reviewed plan of care with patient, patient 
verbalized understanding. Bed in low and locked position, call light within reach. Will 
continue to monitor Q1 hour and PRN.

## 2020-03-26 NOTE — NUR
Dr. Barclay at bedside

MD at bedside, removed patients chest tube. Patient tolerated well. Updated patient on plan 
of care, patient verbalized understanding. Will continue to monitor Q1 hour and PRN.

## 2020-03-26 NOTE — NUR
Opening Shift Note

Assumed care of patient, awake and alert.  No S/S of distress/SOB or pain.  POC  discussed  
and questions answered. Bed is locked in lowest position with side rails up x2 for safety. 
Call light is within reach and patient encouraged to call for assistance PRN, will continue 
to monitor for changes Q1hr and PRN.

## 2020-03-27 VITALS — SYSTOLIC BLOOD PRESSURE: 96 MMHG | DIASTOLIC BLOOD PRESSURE: 49 MMHG

## 2020-03-27 VITALS — SYSTOLIC BLOOD PRESSURE: 88 MMHG | DIASTOLIC BLOOD PRESSURE: 54 MMHG

## 2020-03-27 VITALS — DIASTOLIC BLOOD PRESSURE: 52 MMHG | SYSTOLIC BLOOD PRESSURE: 96 MMHG

## 2020-03-27 RX ADMIN — FUROSEMIDE SCH MG: 20 TABLET ORAL at 09:35

## 2020-03-27 RX ADMIN — Medication SCH ML: at 09:34

## 2020-03-27 RX ADMIN — CEFTRIAXONE SODIUM SCH MLS/HR: 1 INJECTION, POWDER, FOR SOLUTION INTRAMUSCULAR; INTRAVENOUS at 09:34

## 2020-03-27 RX ADMIN — PANTOPRAZOLE SODIUM SCH MG: 40 TABLET, DELAYED RELEASE ORAL at 09:34

## 2020-03-27 RX ADMIN — SPIRONOLACTONE SCH MG: 25 TABLET, FILM COATED ORAL at 05:55

## 2020-03-27 NOTE — NUR
Dr. Barclay at bedside

Discussing plan of care with patient and this RN. Patient to be discharged today. Will 
implement order. Will continue to monitor.

## 2020-03-27 NOTE — NUR
Discharge 

Discharge instructions given as ordered. Encourage to follow up with PCP as instructed. All 
questions and concerns addressed. Patient verbalized understanding.  Medication 
reconciliation form completed and copy given to patient. Home medications held in Pharmacy 
returned to patient. IV removed with catheter intact, pressure dressing applied. Telemetry 
unit returned to ICU. Patient taken to vehicle via wheelchair with all personal belongings, 
accompanied by staff. No signs or symptoms of distress noted at this time.

## 2020-03-27 NOTE — NUR
PATIENT WITH BP OF 88/54. HOSPITALIST PAGED, NEW ORDERS RECEIVED. WILL CARRY OUT AND 
CONTINUE TO MONITOR.

## 2020-03-27 NOTE — NUR
Opening Note

Received report from night shift RN. Patient is awake, alert and oriented x4. No signs or 
symptoms of distress noted at this time. Patient denies pain at this time. Patient is on 
room air, respirations even and unlabored. Reviewed plan of care with patient, patient 
verbalized understanding. Bed in low and locked position, call light within reach. Will 
continue to monitor Q1 hour and PRN.

## 2020-04-25 ENCOUNTER — HOSPITAL ENCOUNTER (INPATIENT)
Dept: HOSPITAL 15 - ER | Age: 50
LOS: 2 days | Discharge: HOME | DRG: 280 | End: 2020-04-27
Attending: INTERNAL MEDICINE | Admitting: NURSE PRACTITIONER
Payer: MEDICAID

## 2020-04-25 VITALS — WEIGHT: 114.42 LBS | BODY MASS INDEX: 19.06 KG/M2 | HEIGHT: 65 IN

## 2020-04-25 DIAGNOSIS — D69.6: ICD-10-CM

## 2020-04-25 DIAGNOSIS — R73.9: ICD-10-CM

## 2020-04-25 DIAGNOSIS — D68.9: ICD-10-CM

## 2020-04-25 DIAGNOSIS — K70.30: ICD-10-CM

## 2020-04-25 DIAGNOSIS — E87.2: ICD-10-CM

## 2020-04-25 DIAGNOSIS — D63.8: ICD-10-CM

## 2020-04-25 DIAGNOSIS — E87.1: ICD-10-CM

## 2020-04-25 DIAGNOSIS — K70.40: Primary | ICD-10-CM

## 2020-04-25 DIAGNOSIS — E43: ICD-10-CM

## 2020-04-25 DIAGNOSIS — J90: ICD-10-CM

## 2020-04-25 DIAGNOSIS — G93.41: ICD-10-CM

## 2020-04-25 DIAGNOSIS — D53.9: ICD-10-CM

## 2020-04-25 LAB
ALBUMIN SERPL-MCNC: 2.3 G/DL (ref 3.4–5)
ALCOHOL, URINE: < 3 MG/DL (ref 0–5)
ALP SERPL-CCNC: 267 U/L (ref 45–117)
ALT SERPL-CCNC: 51 U/L (ref 16–61)
AMPHETAMINES UR QL SCN: NEGATIVE
ANION GAP SERPL CALCULATED.3IONS-SCNC: 12 MMOL/L (ref 5–15)
BARBITURATES UR QL SCN: NEGATIVE
BENZODIAZ UR QL SCN: NEGATIVE
BILIRUB SERPL-MCNC: 5.7 MG/DL (ref 0.2–1)
BUN SERPL-MCNC: 21 MG/DL (ref 7–18)
BUN/CREAT SERPL: 23.3
BZE UR QL SCN: NEGATIVE
CALCIUM SERPL-MCNC: 9 MG/DL (ref 8.5–10.1)
CANNABINOIDS UR QL SCN: NEGATIVE
CHLORIDE SERPL-SCNC: 89 MMOL/L (ref 98–107)
CO2 SERPL-SCNC: 17 MMOL/L (ref 21–32)
GLUCOSE SERPL-MCNC: 146 MG/DL (ref 74–106)
HCT VFR BLD AUTO: 35.5 % (ref 41–53)
HGB BLD-MCNC: 12.4 G/DL (ref 13.5–17.5)
LACTATE PLASV-SCNC: 5.3 MMOL/L (ref 0.4–2)
MCH RBC QN AUTO: 36.6 PG (ref 28–32)
MCV RBC AUTO: 104.5 FL (ref 80–100)
NRBC BLD QL AUTO: 0.2 %
OPIATES UR QL SCN: NEGATIVE
PCP UR QL SCN: NEGATIVE
POTASSIUM SERPL-SCNC: 4.6 MMOL/L (ref 3.5–5.1)
PROT SERPL-MCNC: 8.7 G/DL (ref 6.4–8.2)
SODIUM SERPL-SCNC: 118 MMOL/L (ref 136–145)

## 2020-04-25 PROCEDURE — 51702 INSERT TEMP BLADDER CATH: CPT

## 2020-04-25 PROCEDURE — 81001 URINALYSIS AUTO W/SCOPE: CPT

## 2020-04-25 PROCEDURE — 87040 BLOOD CULTURE FOR BACTERIA: CPT

## 2020-04-25 PROCEDURE — 84443 ASSAY THYROID STIM HORMONE: CPT

## 2020-04-25 PROCEDURE — 80307 DRUG TEST PRSMV CHEM ANLYZR: CPT

## 2020-04-25 PROCEDURE — 82962 GLUCOSE BLOOD TEST: CPT

## 2020-04-25 PROCEDURE — 96361 HYDRATE IV INFUSION ADD-ON: CPT

## 2020-04-25 PROCEDURE — 82140 ASSAY OF AMMONIA: CPT

## 2020-04-25 PROCEDURE — 85025 COMPLETE CBC W/AUTO DIFF WBC: CPT

## 2020-04-25 PROCEDURE — 96365 THER/PROPH/DIAG IV INF INIT: CPT

## 2020-04-25 PROCEDURE — 83735 ASSAY OF MAGNESIUM: CPT

## 2020-04-25 PROCEDURE — 80053 COMPREHEN METABOLIC PANEL: CPT

## 2020-04-25 PROCEDURE — 71045 X-RAY EXAM CHEST 1 VIEW: CPT

## 2020-04-25 PROCEDURE — 85610 PROTHROMBIN TIME: CPT

## 2020-04-25 PROCEDURE — 96375 TX/PRO/DX INJ NEW DRUG ADDON: CPT

## 2020-04-25 PROCEDURE — 83605 ASSAY OF LACTIC ACID: CPT

## 2020-04-25 PROCEDURE — 36415 COLL VENOUS BLD VENIPUNCTURE: CPT

## 2020-04-25 PROCEDURE — 85027 COMPLETE CBC AUTOMATED: CPT

## 2020-04-25 PROCEDURE — 85007 BL SMEAR W/DIFF WBC COUNT: CPT

## 2020-04-25 RX ADMIN — Medication SCH ML: at 23:54

## 2020-04-25 RX ADMIN — Medication SCH STRIP: at 22:09

## 2020-04-25 RX ADMIN — Medication SCH ML: at 18:17

## 2020-04-25 RX ADMIN — Medication SCH STRIP: at 17:26

## 2020-04-25 RX ADMIN — HUMAN INSULIN SCH UNITS: 100 INJECTION, SOLUTION SUBCUTANEOUS at 17:00

## 2020-04-25 RX ADMIN — HUMAN INSULIN SCH UNITS: 100 INJECTION, SOLUTION SUBCUTANEOUS at 22:00

## 2020-04-26 VITALS — DIASTOLIC BLOOD PRESSURE: 58 MMHG | SYSTOLIC BLOOD PRESSURE: 93 MMHG

## 2020-04-26 VITALS — DIASTOLIC BLOOD PRESSURE: 60 MMHG | SYSTOLIC BLOOD PRESSURE: 105 MMHG

## 2020-04-26 VITALS — SYSTOLIC BLOOD PRESSURE: 95 MMHG | DIASTOLIC BLOOD PRESSURE: 60 MMHG

## 2020-04-26 LAB
ALBUMIN SERPL-MCNC: 1.9 G/DL (ref 3.4–5)
ALP SERPL-CCNC: 157 U/L (ref 45–117)
ALT SERPL-CCNC: 51 U/L (ref 16–61)
ANION GAP SERPL CALCULATED.3IONS-SCNC: 6 MMOL/L (ref 5–15)
BILIRUB SERPL-MCNC: 6 MG/DL (ref 0.2–1)
BUN SERPL-MCNC: 14 MG/DL (ref 7–18)
BUN/CREAT SERPL: 22.6
CALCIUM SERPL-MCNC: 8.4 MG/DL (ref 8.5–10.1)
CHLORIDE SERPL-SCNC: 101 MMOL/L (ref 98–107)
CO2 SERPL-SCNC: 21 MMOL/L (ref 21–32)
GLUCOSE SERPL-MCNC: 83 MG/DL (ref 74–106)
HCT VFR BLD AUTO: 32.3 % (ref 41–53)
HGB BLD-MCNC: 11.1 G/DL (ref 13.5–17.5)
INR PPP: 1.69 (ref 0.9–1.15)
MCH RBC QN AUTO: 36.3 PG (ref 28–32)
MCV RBC AUTO: 105.2 FL (ref 80–100)
NRBC BLD QL AUTO: 0 %
POTASSIUM SERPL-SCNC: 4.3 MMOL/L (ref 3.5–5.1)
PROT SERPL-MCNC: 7.3 G/DL (ref 6.4–8.2)
SODIUM SERPL-SCNC: 128 MMOL/L (ref 136–145)

## 2020-04-26 RX ADMIN — Medication SCH ML: at 18:06

## 2020-04-26 RX ADMIN — Medication SCH STRIP: at 22:00

## 2020-04-26 RX ADMIN — Medication SCH STRIP: at 08:07

## 2020-04-26 RX ADMIN — PANTOPRAZOLE SODIUM SCH MG: 40 TABLET, DELAYED RELEASE ORAL at 22:50

## 2020-04-26 RX ADMIN — CEFTRIAXONE SODIUM SCH MLS/HR: 1 INJECTION, POWDER, FOR SOLUTION INTRAMUSCULAR; INTRAVENOUS at 10:15

## 2020-04-26 RX ADMIN — HUMAN INSULIN SCH UNITS: 100 INJECTION, SOLUTION SUBCUTANEOUS at 17:00

## 2020-04-26 RX ADMIN — HUMAN INSULIN SCH UNITS: 100 INJECTION, SOLUTION SUBCUTANEOUS at 22:00

## 2020-04-26 RX ADMIN — Medication SCH ML: at 06:16

## 2020-04-26 RX ADMIN — Medication SCH STRIP: at 11:31

## 2020-04-26 RX ADMIN — Medication SCH ML: at 12:35

## 2020-04-26 RX ADMIN — HUMAN INSULIN SCH UNITS: 100 INJECTION, SOLUTION SUBCUTANEOUS at 11:30

## 2020-04-26 RX ADMIN — SPIRONOLACTONE SCH MG: 25 TABLET, FILM COATED ORAL at 10:39

## 2020-04-26 RX ADMIN — HUMAN INSULIN SCH UNITS: 100 INJECTION, SOLUTION SUBCUTANEOUS at 07:00

## 2020-04-26 RX ADMIN — Medication SCH STRIP: at 18:05

## 2020-04-27 VITALS — DIASTOLIC BLOOD PRESSURE: 63 MMHG | SYSTOLIC BLOOD PRESSURE: 99 MMHG

## 2020-04-27 VITALS — SYSTOLIC BLOOD PRESSURE: 92 MMHG | DIASTOLIC BLOOD PRESSURE: 60 MMHG

## 2020-04-27 VITALS — DIASTOLIC BLOOD PRESSURE: 61 MMHG | SYSTOLIC BLOOD PRESSURE: 96 MMHG

## 2020-04-27 LAB
ALBUMIN SERPL-MCNC: 1.9 G/DL (ref 3.4–5)
ALP SERPL-CCNC: 154 U/L (ref 45–117)
ALT SERPL-CCNC: 50 U/L (ref 16–61)
ANION GAP SERPL CALCULATED.3IONS-SCNC: 5 MMOL/L (ref 5–15)
BILIRUB SERPL-MCNC: 4.7 MG/DL (ref 0.2–1)
BUN SERPL-MCNC: 16 MG/DL (ref 7–18)
BUN/CREAT SERPL: 21.6
CALCIUM SERPL-MCNC: 8.7 MG/DL (ref 8.5–10.1)
CHLORIDE SERPL-SCNC: 100 MMOL/L (ref 98–107)
CO2 SERPL-SCNC: 23 MMOL/L (ref 21–32)
GLUCOSE SERPL-MCNC: 97 MG/DL (ref 74–106)
HCT VFR BLD AUTO: 30.9 % (ref 41–53)
HGB BLD-MCNC: 10.9 G/DL (ref 13.5–17.5)
MAGNESIUM SERPL-MCNC: 1.8 MG/DL (ref 1.6–2.6)
MCH RBC QN AUTO: 36.9 PG (ref 28–32)
MCV RBC AUTO: 104.7 FL (ref 80–100)
POTASSIUM SERPL-SCNC: 4.5 MMOL/L (ref 3.5–5.1)
PROT SERPL-MCNC: 7.3 G/DL (ref 6.4–8.2)
SODIUM SERPL-SCNC: 128 MMOL/L (ref 136–145)

## 2020-04-27 RX ADMIN — HUMAN INSULIN SCH UNITS: 100 INJECTION, SOLUTION SUBCUTANEOUS at 06:38

## 2020-04-27 RX ADMIN — Medication SCH ML: at 01:58

## 2020-04-27 RX ADMIN — PANTOPRAZOLE SODIUM SCH MG: 40 TABLET, DELAYED RELEASE ORAL at 11:24

## 2020-04-27 RX ADMIN — Medication SCH ML: at 18:00

## 2020-04-27 RX ADMIN — Medication SCH ML: at 06:37

## 2020-04-27 RX ADMIN — Medication SCH ML: at 11:51

## 2020-04-27 RX ADMIN — HUMAN INSULIN SCH UNITS: 100 INJECTION, SOLUTION SUBCUTANEOUS at 17:00

## 2020-04-27 RX ADMIN — Medication SCH STRIP: at 17:00

## 2020-04-27 RX ADMIN — CEFTRIAXONE SODIUM SCH MLS/HR: 1 INJECTION, POWDER, FOR SOLUTION INTRAMUSCULAR; INTRAVENOUS at 09:00

## 2020-04-27 RX ADMIN — Medication SCH STRIP: at 11:24

## 2020-04-27 RX ADMIN — HUMAN INSULIN SCH UNITS: 100 INJECTION, SOLUTION SUBCUTANEOUS at 11:30

## 2020-04-27 RX ADMIN — SPIRONOLACTONE SCH MG: 25 TABLET, FILM COATED ORAL at 11:24

## 2020-04-27 RX ADMIN — Medication SCH STRIP: at 06:38

## 2020-04-27 NOTE — NUR
Jarvis catheter dc'd

Order to discontinue jarvis catheter.  Jarvis dc'd  with clean technique following deflation 
of balloon. Patient tolerated well with no complaints of pain. Continue care.

## 2020-04-27 NOTE — NUR
Discharge instructions given as ordered. Encourage to follow up with PMD as instructed. All 
questions and concerns addressed. Patient verbalized understanding.  Medication 
reconciliation form completed and SENT ELECTRONIC . PATIENT SELF-REMOVED IV removed with 
catheter intact, NO pressure dressing applied.  Telemetry unit returned to ICU. Patient 
taken to vehicle via wheelchair with all personal belongings, accompanied by staff and 
family member. No distress noted at time of departure.

## 2020-04-27 NOTE — NUR
CONTACTED PHYSICIAN

CALLED DR. SULTANA REPORT PATIENT PAIN AND LACK OF PAIN MEDICATION ON MAR.  
PRESCRIBED 2MG/1ML OF MORPHINE FOR SEVERE PAIN 7-10, PRN Q4HRS.

## 2020-04-27 NOTE — NUR
PATIENT C/O PAIN

PATIENT HAS COMPLAINTS OF PAIN 10/10. ABDOMEN PAIN, SHARP. NO MEDICATIONS ON MAR, WILL 
CONTACT PHYSICIAN.

## 2020-05-28 ENCOUNTER — HOSPITAL ENCOUNTER (INPATIENT)
Dept: HOSPITAL 15 - ER | Age: 50
LOS: 3 days | Discharge: HOME | DRG: 279 | End: 2020-05-31
Attending: INTERNAL MEDICINE | Admitting: INTERNAL MEDICINE
Payer: MEDICAID

## 2020-05-28 VITALS — SYSTOLIC BLOOD PRESSURE: 96 MMHG | DIASTOLIC BLOOD PRESSURE: 65 MMHG

## 2020-05-28 VITALS — SYSTOLIC BLOOD PRESSURE: 92 MMHG | DIASTOLIC BLOOD PRESSURE: 61 MMHG

## 2020-05-28 VITALS — DIASTOLIC BLOOD PRESSURE: 61 MMHG | SYSTOLIC BLOOD PRESSURE: 92 MMHG

## 2020-05-28 VITALS — WEIGHT: 117.95 LBS | HEIGHT: 60 IN | BODY MASS INDEX: 23.16 KG/M2

## 2020-05-28 VITALS — DIASTOLIC BLOOD PRESSURE: 65 MMHG | SYSTOLIC BLOOD PRESSURE: 93 MMHG

## 2020-05-28 DIAGNOSIS — K80.20: ICD-10-CM

## 2020-05-28 DIAGNOSIS — K72.90: Primary | ICD-10-CM

## 2020-05-28 DIAGNOSIS — I95.9: ICD-10-CM

## 2020-05-28 DIAGNOSIS — D61.818: ICD-10-CM

## 2020-05-28 DIAGNOSIS — J98.11: ICD-10-CM

## 2020-05-28 DIAGNOSIS — K74.60: ICD-10-CM

## 2020-05-28 DIAGNOSIS — B19.20: ICD-10-CM

## 2020-05-28 DIAGNOSIS — E43: ICD-10-CM

## 2020-05-28 DIAGNOSIS — E87.1: ICD-10-CM

## 2020-05-28 DIAGNOSIS — R73.9: ICD-10-CM

## 2020-05-28 DIAGNOSIS — J90: ICD-10-CM

## 2020-05-28 LAB
ALBUMIN SERPL-MCNC: 2 G/DL (ref 3.4–5)
ALP SERPL-CCNC: 210 U/L (ref 45–117)
ALT SERPL-CCNC: 41 U/L (ref 16–61)
ANION GAP SERPL CALCULATED.3IONS-SCNC: 9 MMOL/L (ref 5–15)
APTT PPP: 41 SEC (ref 23.64–32.05)
BILIRUB SERPL-MCNC: 4.9 MG/DL (ref 0.2–1)
BUN SERPL-MCNC: 15 MG/DL (ref 7–18)
BUN/CREAT SERPL: 16
CALCIUM SERPL-MCNC: 8.1 MG/DL (ref 8.5–10.1)
CHLORIDE SERPL-SCNC: 97 MMOL/L (ref 98–107)
CO2 SERPL-SCNC: 24 MMOL/L (ref 21–32)
GLUCOSE SERPL-MCNC: 123 MG/DL (ref 74–106)
HCT VFR BLD AUTO: 34.9 % (ref 41–53)
HGB BLD-MCNC: 12.2 G/DL (ref 13.5–17.5)
INR PPP: 1.73 (ref 0.9–1.15)
MCH RBC QN AUTO: 36.7 PG (ref 28–32)
MCV RBC AUTO: 105.1 FL (ref 80–100)
NRBC BLD QL AUTO: 0 %
POTASSIUM SERPL-SCNC: 4 MMOL/L (ref 3.5–5.1)
PROT SERPL-MCNC: 7 G/DL (ref 6.4–8.2)
SODIUM SERPL-SCNC: 130 MMOL/L (ref 136–145)

## 2020-05-28 PROCEDURE — 81001 URINALYSIS AUTO W/SCOPE: CPT

## 2020-05-28 PROCEDURE — 99291 CRITICAL CARE FIRST HOUR: CPT

## 2020-05-28 PROCEDURE — 82140 ASSAY OF AMMONIA: CPT

## 2020-05-28 PROCEDURE — 85025 COMPLETE CBC W/AUTO DIFF WBC: CPT

## 2020-05-28 PROCEDURE — 70450 CT HEAD/BRAIN W/O DYE: CPT

## 2020-05-28 PROCEDURE — 87040 BLOOD CULTURE FOR BACTERIA: CPT

## 2020-05-28 PROCEDURE — 96375 TX/PRO/DX INJ NEW DRUG ADDON: CPT

## 2020-05-28 PROCEDURE — 85730 THROMBOPLASTIN TIME PARTIAL: CPT

## 2020-05-28 PROCEDURE — 84484 ASSAY OF TROPONIN QUANT: CPT

## 2020-05-28 PROCEDURE — 36415 COLL VENOUS BLD VENIPUNCTURE: CPT

## 2020-05-28 PROCEDURE — 96367 TX/PROPH/DG ADDL SEQ IV INF: CPT

## 2020-05-28 PROCEDURE — 74176 CT ABD & PELVIS W/O CONTRAST: CPT

## 2020-05-28 PROCEDURE — 96365 THER/PROPH/DIAG IV INF INIT: CPT

## 2020-05-28 PROCEDURE — 94640 AIRWAY INHALATION TREATMENT: CPT

## 2020-05-28 PROCEDURE — 85610 PROTHROMBIN TIME: CPT

## 2020-05-28 PROCEDURE — 96376 TX/PRO/DX INJ SAME DRUG ADON: CPT

## 2020-05-28 PROCEDURE — 83880 ASSAY OF NATRIURETIC PEPTIDE: CPT

## 2020-05-28 PROCEDURE — 71045 X-RAY EXAM CHEST 1 VIEW: CPT

## 2020-05-28 PROCEDURE — 93005 ELECTROCARDIOGRAM TRACING: CPT

## 2020-05-28 PROCEDURE — 80053 COMPREHEN METABOLIC PANEL: CPT

## 2020-05-28 PROCEDURE — 80307 DRUG TEST PRSMV CHEM ANLYZR: CPT

## 2020-05-28 RX ADMIN — Medication SCH ML: at 12:23

## 2020-05-28 RX ADMIN — ALBUTEROL SULFATE SCH MG: 2.5 SOLUTION RESPIRATORY (INHALATION) at 12:00

## 2020-05-28 RX ADMIN — Medication SCH ML: at 20:24

## 2020-05-28 RX ADMIN — ALBUTEROL SULFATE SCH MG: 2.5 SOLUTION RESPIRATORY (INHALATION) at 18:09

## 2020-05-28 RX ADMIN — SODIUM CHLORIDE SCH MLS/HR: 0.9 INJECTION, SOLUTION INTRAVENOUS at 11:57

## 2020-05-28 RX ADMIN — IPRATROPIUM BROMIDE SCH MG: 0.5 SOLUTION RESPIRATORY (INHALATION) at 23:22

## 2020-05-28 RX ADMIN — IPRATROPIUM BROMIDE SCH MG: 0.5 SOLUTION RESPIRATORY (INHALATION) at 12:00

## 2020-05-28 RX ADMIN — CLINDAMYCIN PHOSPHATE SCH MLS/HR: 150 INJECTION, SOLUTION INTRAVENOUS at 21:54

## 2020-05-28 RX ADMIN — Medication SCH ML: at 15:14

## 2020-05-28 RX ADMIN — CLINDAMYCIN PHOSPHATE SCH MLS/HR: 150 INJECTION, SOLUTION INTRAVENOUS at 14:46

## 2020-05-28 RX ADMIN — IPRATROPIUM BROMIDE SCH MG: 0.5 SOLUTION RESPIRATORY (INHALATION) at 18:09

## 2020-05-28 RX ADMIN — ALBUTEROL SULFATE SCH MG: 2.5 SOLUTION RESPIRATORY (INHALATION) at 23:22

## 2020-05-29 VITALS — DIASTOLIC BLOOD PRESSURE: 64 MMHG | SYSTOLIC BLOOD PRESSURE: 103 MMHG

## 2020-05-29 VITALS — DIASTOLIC BLOOD PRESSURE: 56 MMHG | SYSTOLIC BLOOD PRESSURE: 88 MMHG

## 2020-05-29 VITALS — SYSTOLIC BLOOD PRESSURE: 92 MMHG | DIASTOLIC BLOOD PRESSURE: 58 MMHG

## 2020-05-29 VITALS — SYSTOLIC BLOOD PRESSURE: 89 MMHG | DIASTOLIC BLOOD PRESSURE: 59 MMHG

## 2020-05-29 VITALS — DIASTOLIC BLOOD PRESSURE: 60 MMHG | SYSTOLIC BLOOD PRESSURE: 85 MMHG

## 2020-05-29 LAB
ALCOHOL, URINE: 9 MG/DL (ref 0–10)
AMPHETAMINES UR QL SCN: NEGATIVE
BARBITURATES UR QL SCN: NEGATIVE
BENZODIAZ UR QL SCN: NEGATIVE
BZE UR QL SCN: NEGATIVE
CANNABINOIDS UR QL SCN: NEGATIVE
INR PPP: 1.58 (ref 0.9–1.15)
OPIATES UR QL SCN: POSITIVE
PCP UR QL SCN: NEGATIVE

## 2020-05-29 RX ADMIN — IPRATROPIUM BROMIDE SCH MG: 0.5 SOLUTION RESPIRATORY (INHALATION) at 12:00

## 2020-05-29 RX ADMIN — Medication SCH ML: at 18:00

## 2020-05-29 RX ADMIN — Medication SCH ML: at 08:25

## 2020-05-29 RX ADMIN — Medication SCH ML: at 03:57

## 2020-05-29 RX ADMIN — SODIUM CHLORIDE SCH MLS/HR: 0.9 INJECTION, SOLUTION INTRAVENOUS at 00:43

## 2020-05-29 RX ADMIN — ALBUTEROL SULFATE SCH MG: 2.5 SOLUTION RESPIRATORY (INHALATION) at 07:21

## 2020-05-29 RX ADMIN — Medication SCH ML: at 00:10

## 2020-05-29 RX ADMIN — ALBUTEROL SULFATE SCH MG: 2.5 SOLUTION RESPIRATORY (INHALATION) at 12:00

## 2020-05-29 RX ADMIN — Medication SCH ML: at 21:59

## 2020-05-29 RX ADMIN — PANTOPRAZOLE SODIUM SCH MG: 40 TABLET, DELAYED RELEASE ORAL at 21:59

## 2020-05-29 RX ADMIN — CLINDAMYCIN PHOSPHATE SCH MLS/HR: 150 INJECTION, SOLUTION INTRAVENOUS at 14:28

## 2020-05-29 RX ADMIN — SPIRONOLACTONE SCH MG: 25 TABLET, FILM COATED ORAL at 21:59

## 2020-05-29 RX ADMIN — IPRATROPIUM BROMIDE SCH MG: 0.5 SOLUTION RESPIRATORY (INHALATION) at 07:21

## 2020-05-29 RX ADMIN — Medication SCH ML: at 14:28

## 2020-05-29 RX ADMIN — CLINDAMYCIN PHOSPHATE SCH MLS/HR: 150 INJECTION, SOLUTION INTRAVENOUS at 05:56

## 2020-05-29 RX ADMIN — SODIUM CHLORIDE SCH MLS/HR: 0.9 INJECTION, SOLUTION INTRAVENOUS at 14:28

## 2020-05-30 VITALS — SYSTOLIC BLOOD PRESSURE: 86 MMHG | DIASTOLIC BLOOD PRESSURE: 53 MMHG

## 2020-05-30 VITALS — DIASTOLIC BLOOD PRESSURE: 61 MMHG | SYSTOLIC BLOOD PRESSURE: 97 MMHG

## 2020-05-30 VITALS — DIASTOLIC BLOOD PRESSURE: 63 MMHG | SYSTOLIC BLOOD PRESSURE: 98 MMHG

## 2020-05-30 VITALS — DIASTOLIC BLOOD PRESSURE: 58 MMHG | SYSTOLIC BLOOD PRESSURE: 91 MMHG

## 2020-05-30 VITALS — DIASTOLIC BLOOD PRESSURE: 63 MMHG | SYSTOLIC BLOOD PRESSURE: 100 MMHG

## 2020-05-30 LAB
ALBUMIN SERPL-MCNC: 1.9 G/DL (ref 3.4–5)
ALP SERPL-CCNC: 217 U/L (ref 45–117)
ALT SERPL-CCNC: 38 U/L (ref 16–61)
ANION GAP SERPL CALCULATED.3IONS-SCNC: 7 MMOL/L (ref 5–15)
BILIRUB SERPL-MCNC: 3.9 MG/DL (ref 0.2–1)
BUN SERPL-MCNC: 15 MG/DL (ref 7–18)
BUN/CREAT SERPL: 19.2
CALCIUM SERPL-MCNC: 8.2 MG/DL (ref 8.5–10.1)
CHLORIDE SERPL-SCNC: 105 MMOL/L (ref 98–107)
CO2 SERPL-SCNC: 22 MMOL/L (ref 21–32)
GLUCOSE SERPL-MCNC: 90 MG/DL (ref 74–106)
POTASSIUM SERPL-SCNC: 4.6 MMOL/L (ref 3.5–5.1)
PROT SERPL-MCNC: 7.1 G/DL (ref 6.4–8.2)
SODIUM SERPL-SCNC: 134 MMOL/L (ref 136–145)

## 2020-05-30 RX ADMIN — SPIRONOLACTONE SCH MG: 25 TABLET, FILM COATED ORAL at 22:32

## 2020-05-30 RX ADMIN — SPIRONOLACTONE SCH MG: 25 TABLET, FILM COATED ORAL at 10:22

## 2020-05-30 RX ADMIN — Medication SCH ML: at 10:22

## 2020-05-30 RX ADMIN — Medication SCH ML: at 13:52

## 2020-05-30 RX ADMIN — Medication SCH ML: at 22:31

## 2020-05-30 RX ADMIN — PANTOPRAZOLE SODIUM SCH MG: 40 TABLET, DELAYED RELEASE ORAL at 10:22

## 2020-05-30 RX ADMIN — Medication SCH ML: at 08:00

## 2020-05-30 RX ADMIN — Medication SCH ML: at 18:00

## 2020-05-30 RX ADMIN — PANTOPRAZOLE SODIUM SCH MG: 40 TABLET, DELAYED RELEASE ORAL at 22:32

## 2020-05-31 VITALS — SYSTOLIC BLOOD PRESSURE: 99 MMHG | DIASTOLIC BLOOD PRESSURE: 65 MMHG

## 2020-05-31 VITALS — SYSTOLIC BLOOD PRESSURE: 97 MMHG | DIASTOLIC BLOOD PRESSURE: 61 MMHG

## 2020-05-31 LAB
ALBUMIN SERPL-MCNC: 2 G/DL (ref 3.4–5)
ALP SERPL-CCNC: 205 U/L (ref 45–117)
ALT SERPL-CCNC: 35 U/L (ref 16–61)
ANION GAP SERPL CALCULATED.3IONS-SCNC: 7 MMOL/L (ref 5–15)
BILIRUB SERPL-MCNC: 3.5 MG/DL (ref 0.2–1)
BUN SERPL-MCNC: 13 MG/DL (ref 7–18)
BUN/CREAT SERPL: 17.3
CALCIUM SERPL-MCNC: 8.5 MG/DL (ref 8.5–10.1)
CHLORIDE SERPL-SCNC: 101 MMOL/L (ref 98–107)
CO2 SERPL-SCNC: 23 MMOL/L (ref 21–32)
GLUCOSE SERPL-MCNC: 97 MG/DL (ref 74–106)
HCT VFR BLD AUTO: 34.7 % (ref 41–53)
HGB BLD-MCNC: 12.2 G/DL (ref 13.5–17.5)
MCH RBC QN AUTO: 37.2 PG (ref 28–32)
MCV RBC AUTO: 105.5 FL (ref 80–100)
NRBC BLD QL AUTO: 0.1 %
POTASSIUM SERPL-SCNC: 4.4 MMOL/L (ref 3.5–5.1)
PROT SERPL-MCNC: 7.2 G/DL (ref 6.4–8.2)
SODIUM SERPL-SCNC: 131 MMOL/L (ref 136–145)

## 2020-05-31 RX ADMIN — SPIRONOLACTONE SCH MG: 25 TABLET, FILM COATED ORAL at 10:00

## 2020-05-31 RX ADMIN — PANTOPRAZOLE SODIUM SCH MG: 40 TABLET, DELAYED RELEASE ORAL at 10:00

## 2020-05-31 RX ADMIN — Medication SCH ML: at 08:22

## 2020-05-31 RX ADMIN — Medication SCH ML: at 10:00

## 2020-06-07 ENCOUNTER — HOSPITAL ENCOUNTER (INPATIENT)
Dept: HOSPITAL 15 - ER | Age: 50
LOS: 1 days | Discharge: HOME | DRG: 254 | End: 2020-06-08
Attending: INTERNAL MEDICINE | Admitting: NURSE PRACTITIONER
Payer: MEDICAID

## 2020-06-07 VITALS — HEIGHT: 67 IN | WEIGHT: 133 LBS | BODY MASS INDEX: 20.88 KG/M2

## 2020-06-07 DIAGNOSIS — K70.30: ICD-10-CM

## 2020-06-07 DIAGNOSIS — E87.1: ICD-10-CM

## 2020-06-07 DIAGNOSIS — Z20.828: ICD-10-CM

## 2020-06-07 DIAGNOSIS — B02.9: ICD-10-CM

## 2020-06-07 DIAGNOSIS — L08.9: ICD-10-CM

## 2020-06-07 DIAGNOSIS — I10: ICD-10-CM

## 2020-06-07 DIAGNOSIS — K80.20: ICD-10-CM

## 2020-06-07 DIAGNOSIS — K72.90: ICD-10-CM

## 2020-06-07 DIAGNOSIS — K40.90: Primary | ICD-10-CM

## 2020-06-07 DIAGNOSIS — F32.9: ICD-10-CM

## 2020-06-07 DIAGNOSIS — E43: ICD-10-CM

## 2020-06-07 DIAGNOSIS — J18.9: ICD-10-CM

## 2020-06-07 DIAGNOSIS — J90: ICD-10-CM

## 2020-06-07 DIAGNOSIS — D61.818: ICD-10-CM

## 2020-06-07 LAB
ALBUMIN SERPL-MCNC: 2 G/DL (ref 3.4–5)
ALP SERPL-CCNC: 287 U/L (ref 45–117)
ALT SERPL-CCNC: 44 U/L (ref 16–61)
ANION GAP SERPL CALCULATED.3IONS-SCNC: 8 MMOL/L (ref 5–15)
BILIRUB SERPL-MCNC: 3 MG/DL (ref 0.2–1)
BUN SERPL-MCNC: 12 MG/DL (ref 7–18)
BUN/CREAT SERPL: 17.1
CALCIUM SERPL-MCNC: 7.8 MG/DL (ref 8.5–10.1)
CHLORIDE SERPL-SCNC: 99 MMOL/L (ref 98–107)
CO2 SERPL-SCNC: 22 MMOL/L (ref 21–32)
GLUCOSE SERPL-MCNC: 102 MG/DL (ref 74–106)
HCT VFR BLD AUTO: 35.2 % (ref 41–53)
HGB BLD-MCNC: 12.4 G/DL (ref 13.5–17.5)
LIPASE SERPL-CCNC: 204 U/L (ref 73–393)
MAGNESIUM SERPL-MCNC: 1.8 MG/DL (ref 1.6–2.6)
MCH RBC QN AUTO: 37.4 PG (ref 28–32)
MCV RBC AUTO: 106.1 FL (ref 80–100)
NRBC BLD QL AUTO: 0.1 %
POTASSIUM SERPL-SCNC: 4.4 MMOL/L (ref 3.5–5.1)
PROT SERPL-MCNC: 7.4 G/DL (ref 6.4–8.2)
SODIUM SERPL-SCNC: 129 MMOL/L (ref 136–145)

## 2020-06-07 PROCEDURE — 87040 BLOOD CULTURE FOR BACTERIA: CPT

## 2020-06-07 PROCEDURE — 36415 COLL VENOUS BLD VENIPUNCTURE: CPT

## 2020-06-07 PROCEDURE — 83735 ASSAY OF MAGNESIUM: CPT

## 2020-06-07 PROCEDURE — 71045 X-RAY EXAM CHEST 1 VIEW: CPT

## 2020-06-07 PROCEDURE — 76604 US EXAM CHEST: CPT

## 2020-06-07 PROCEDURE — 93005 ELECTROCARDIOGRAM TRACING: CPT

## 2020-06-07 PROCEDURE — 87804 INFLUENZA ASSAY W/OPTIC: CPT

## 2020-06-07 PROCEDURE — 81001 URINALYSIS AUTO W/SCOPE: CPT

## 2020-06-07 PROCEDURE — 80053 COMPREHEN METABOLIC PANEL: CPT

## 2020-06-07 PROCEDURE — 85025 COMPLETE CBC W/AUTO DIFF WBC: CPT

## 2020-06-07 PROCEDURE — 87880 STREP A ASSAY W/OPTIC: CPT

## 2020-06-07 PROCEDURE — 87070 CULTURE OTHR SPECIMN AEROBIC: CPT

## 2020-06-07 PROCEDURE — 74176 CT ABD & PELVIS W/O CONTRAST: CPT

## 2020-06-07 PROCEDURE — 83690 ASSAY OF LIPASE: CPT

## 2020-06-07 PROCEDURE — 82140 ASSAY OF AMMONIA: CPT

## 2020-06-07 PROCEDURE — 87081 CULTURE SCREEN ONLY: CPT

## 2020-06-07 PROCEDURE — 83605 ASSAY OF LACTIC ACID: CPT

## 2020-06-07 RX ADMIN — PROPRANOLOL HYDROCHLORIDE SCH MG: 20 TABLET ORAL at 21:51

## 2020-06-07 RX ADMIN — Medication SCH ML: at 21:50

## 2020-06-07 RX ADMIN — PANTOPRAZOLE SODIUM SCH MG: 40 TABLET, DELAYED RELEASE ORAL at 21:52

## 2020-06-07 RX ADMIN — SPIRONOLACTONE SCH MG: 25 TABLET, FILM COATED ORAL at 21:50

## 2020-06-08 VITALS — SYSTOLIC BLOOD PRESSURE: 83 MMHG | DIASTOLIC BLOOD PRESSURE: 60 MMHG

## 2020-06-08 VITALS — SYSTOLIC BLOOD PRESSURE: 90 MMHG | DIASTOLIC BLOOD PRESSURE: 60 MMHG

## 2020-06-08 LAB
ALBUMIN SERPL-MCNC: 1.7 G/DL (ref 3.4–5)
ALP SERPL-CCNC: 177 U/L (ref 45–117)
ALT SERPL-CCNC: 36 U/L (ref 16–61)
ANION GAP SERPL CALCULATED.3IONS-SCNC: 5 MMOL/L (ref 5–15)
BILIRUB SERPL-MCNC: 2.5 MG/DL (ref 0.2–1)
BUN SERPL-MCNC: 10 MG/DL (ref 7–18)
BUN/CREAT SERPL: 15.4
CALCIUM SERPL-MCNC: 7.7 MG/DL (ref 8.5–10.1)
CHLORIDE SERPL-SCNC: 110 MMOL/L (ref 98–107)
CO2 SERPL-SCNC: 22 MMOL/L (ref 21–32)
GLUCOSE SERPL-MCNC: 86 MG/DL (ref 74–106)
HCT VFR BLD AUTO: 36.5 % (ref 41–53)
HGB BLD-MCNC: 12.6 G/DL (ref 13.5–17.5)
MCH RBC QN AUTO: 36.9 PG (ref 28–32)
MCV RBC AUTO: 106.6 FL (ref 80–100)
NRBC BLD QL AUTO: 0.5 %
POTASSIUM SERPL-SCNC: 4.8 MMOL/L (ref 3.5–5.1)
PROT SERPL-MCNC: 6 G/DL (ref 6.4–8.2)
SODIUM SERPL-SCNC: 137 MMOL/L (ref 136–145)

## 2020-06-08 RX ADMIN — ACYCLOVIR SCH MG: 400 TABLET ORAL at 13:15

## 2020-06-08 RX ADMIN — SPIRONOLACTONE SCH MG: 25 TABLET, FILM COATED ORAL at 09:32

## 2020-06-08 RX ADMIN — ACYCLOVIR SCH MG: 400 TABLET ORAL at 06:58

## 2020-06-08 RX ADMIN — PROPRANOLOL HYDROCHLORIDE SCH MG: 20 TABLET ORAL at 09:33

## 2020-06-08 RX ADMIN — ACYCLOVIR SCH MG: 400 TABLET ORAL at 09:33

## 2020-06-08 RX ADMIN — ACYCLOVIR SCH MG: 400 TABLET ORAL at 17:27

## 2020-06-08 RX ADMIN — Medication SCH ML: at 09:32

## 2020-06-08 RX ADMIN — PANTOPRAZOLE SODIUM SCH MG: 40 TABLET, DELAYED RELEASE ORAL at 09:33

## 2020-06-08 NOTE — NUR
Telemetry admit from JEOVANY GRIFFIN admitted to Telemetry unit after SBAR received. Patient oriented to Pily Dowd, primary RN, unit, room, bed, and unit policies regarding patient care and visiting 
hours. Patient now on continuous telemetry monitoring, tele box # 46 and telemetry reading 
on arrival to unit is sinus rhythm in the 70's. Update on POC and instructed to call for 
assistance as needed. Bed locked in lowest position, side rails up x2, call light within 
reach. Will continue to monitor q1hr and PRN.

## 2020-06-08 NOTE — NUR
Discharge home

Discharge instructions given as ordered with  at bedside. Encourage to follow up 
with PMD, Dr. Su, and pulmonologist as instructed. All questions and concerns addressed. 
Patient verbalized understanding. Medication reconciliation form completed and copy given to 
patient. IV removed with catheter intact, pressure dressing applied. Telemetry unit returned 
to ICU. Patient taken to vehicle via ambulation with all personal belongings, accompanied by 
staff. No distress noted at time of departure.

## 2020-06-21 ENCOUNTER — HOSPITAL ENCOUNTER (INPATIENT)
Dept: HOSPITAL 15 - ER | Age: 50
LOS: 3 days | Discharge: HOME HEALTH | DRG: 279 | End: 2020-06-24
Attending: INTERNAL MEDICINE | Admitting: NURSE PRACTITIONER
Payer: MEDICAID

## 2020-06-21 VITALS — SYSTOLIC BLOOD PRESSURE: 111 MMHG | DIASTOLIC BLOOD PRESSURE: 69 MMHG

## 2020-06-21 VITALS — WEIGHT: 143.52 LBS | BODY MASS INDEX: 23.07 KG/M2 | HEIGHT: 66 IN

## 2020-06-21 DIAGNOSIS — K72.90: Primary | ICD-10-CM

## 2020-06-21 DIAGNOSIS — D61.818: ICD-10-CM

## 2020-06-21 DIAGNOSIS — D63.8: ICD-10-CM

## 2020-06-21 DIAGNOSIS — I95.9: ICD-10-CM

## 2020-06-21 DIAGNOSIS — K40.90: ICD-10-CM

## 2020-06-21 DIAGNOSIS — E43: ICD-10-CM

## 2020-06-21 DIAGNOSIS — Z79.899: ICD-10-CM

## 2020-06-21 DIAGNOSIS — J90: ICD-10-CM

## 2020-06-21 DIAGNOSIS — N62: ICD-10-CM

## 2020-06-21 DIAGNOSIS — I10: ICD-10-CM

## 2020-06-21 DIAGNOSIS — D69.6: ICD-10-CM

## 2020-06-21 DIAGNOSIS — K80.20: ICD-10-CM

## 2020-06-21 LAB
ALBUMIN SERPL-MCNC: 1.7 G/DL (ref 3.4–5)
ALCOHOL, URINE: < 3 MG/DL (ref 0–10)
ALP SERPL-CCNC: 227 U/L (ref 45–117)
ALT SERPL-CCNC: 31 U/L (ref 16–61)
AMPHETAMINES UR QL SCN: NEGATIVE
AMYLASE SERPL-CCNC: 103 U/L (ref 25–115)
ANION GAP SERPL CALCULATED.3IONS-SCNC: 7 MMOL/L (ref 5–15)
BARBITURATES UR QL SCN: NEGATIVE
BENZODIAZ UR QL SCN: NEGATIVE
BILIRUB SERPL-MCNC: 3.1 MG/DL (ref 0.2–1)
BUN SERPL-MCNC: 11 MG/DL (ref 7–18)
BUN/CREAT SERPL: 16.2
BZE UR QL SCN: NEGATIVE
CALCIUM SERPL-MCNC: 7.5 MG/DL (ref 8.5–10.1)
CANNABINOIDS UR QL SCN: NEGATIVE
CHLORIDE SERPL-SCNC: 103 MMOL/L (ref 98–107)
CO2 SERPL-SCNC: 24 MMOL/L (ref 21–32)
GLUCOSE SERPL-MCNC: 135 MG/DL (ref 74–106)
HCT VFR BLD AUTO: 31 % (ref 41–53)
HGB BLD-MCNC: 10.8 G/DL (ref 13.5–17.5)
LIPASE SERPL-CCNC: 119 U/L (ref 73–393)
MAGNESIUM SERPL-MCNC: 2.2 MG/DL (ref 1.6–2.6)
MCH RBC QN AUTO: 37.5 PG (ref 28–32)
MCV RBC AUTO: 107.3 FL (ref 80–100)
NRBC BLD QL AUTO: 0.2 %
OPIATES UR QL SCN: NEGATIVE
PCP UR QL SCN: NEGATIVE
POTASSIUM SERPL-SCNC: 4.6 MMOL/L (ref 3.5–5.1)
PROT SERPL-MCNC: 6.6 G/DL (ref 6.4–8.2)
SODIUM SERPL-SCNC: 134 MMOL/L (ref 136–145)

## 2020-06-21 PROCEDURE — 76700 US EXAM ABDOM COMPLETE: CPT

## 2020-06-21 PROCEDURE — 84484 ASSAY OF TROPONIN QUANT: CPT

## 2020-06-21 PROCEDURE — 80053 COMPREHEN METABOLIC PANEL: CPT

## 2020-06-21 PROCEDURE — 80307 DRUG TEST PRSMV CHEM ANLYZR: CPT

## 2020-06-21 PROCEDURE — 99291 CRITICAL CARE FIRST HOUR: CPT

## 2020-06-21 PROCEDURE — 83690 ASSAY OF LIPASE: CPT

## 2020-06-21 PROCEDURE — 87081 CULTURE SCREEN ONLY: CPT

## 2020-06-21 PROCEDURE — 81001 URINALYSIS AUTO W/SCOPE: CPT

## 2020-06-21 PROCEDURE — 70450 CT HEAD/BRAIN W/O DYE: CPT

## 2020-06-21 PROCEDURE — 82248 BILIRUBIN DIRECT: CPT

## 2020-06-21 PROCEDURE — 83735 ASSAY OF MAGNESIUM: CPT

## 2020-06-21 PROCEDURE — 82140 ASSAY OF AMMONIA: CPT

## 2020-06-21 PROCEDURE — 85610 PROTHROMBIN TIME: CPT

## 2020-06-21 PROCEDURE — 82150 ASSAY OF AMYLASE: CPT

## 2020-06-21 PROCEDURE — 74176 CT ABD & PELVIS W/O CONTRAST: CPT

## 2020-06-21 PROCEDURE — 36415 COLL VENOUS BLD VENIPUNCTURE: CPT

## 2020-06-21 PROCEDURE — 85025 COMPLETE CBC W/AUTO DIFF WBC: CPT

## 2020-06-21 RX ADMIN — SPIRONOLACTONE SCH MG: 25 TABLET, FILM COATED ORAL at 21:39

## 2020-06-21 RX ADMIN — PANTOPRAZOLE SODIUM SCH MG: 40 TABLET, DELAYED RELEASE ORAL at 21:40

## 2020-06-21 RX ADMIN — Medication SCH ML: at 17:23

## 2020-06-21 RX ADMIN — MORPHINE SULFATE PRN MG: 2 INJECTION, SOLUTION INTRAMUSCULAR; INTRAVENOUS at 18:23

## 2020-06-21 RX ADMIN — Medication SCH ML: at 21:39

## 2020-06-21 NOTE — NUR
Telemetry admit from LILLY FELIXJEOVANY admitted to Telemetry unit. Patient oriented to TITA VENCES primary 
RN, unit, room, bed, and unit policies regarding patient care and visiting hours. Patient 
now on continuous telemetry monitoring, tele box #36  and telemetry reading on arrival to 
unit is SR. Patient on room air, weighed by bed scale and encouraged to call if they need 
something. All questions and concerns addressed, patient verbalized understanding.Bed in 
lowest locked position, safety precautions in place and call light within reach. Will 
continue to monitor. 


-------------------------------------------------------------------------------

Signed:    06/22/20 at 0047 by TITA VENCES SN <Co-Signature Required>

Co-Signed: 06/22/20 at 0047 by Maty Elizabeth RN RN

-------------------------------------------------------------------------------

## 2020-06-22 VITALS — SYSTOLIC BLOOD PRESSURE: 103 MMHG | DIASTOLIC BLOOD PRESSURE: 70 MMHG

## 2020-06-22 VITALS — SYSTOLIC BLOOD PRESSURE: 108 MMHG | DIASTOLIC BLOOD PRESSURE: 66 MMHG

## 2020-06-22 VITALS — DIASTOLIC BLOOD PRESSURE: 71 MMHG | SYSTOLIC BLOOD PRESSURE: 104 MMHG

## 2020-06-22 VITALS — DIASTOLIC BLOOD PRESSURE: 74 MMHG | SYSTOLIC BLOOD PRESSURE: 118 MMHG

## 2020-06-22 VITALS — DIASTOLIC BLOOD PRESSURE: 64 MMHG | SYSTOLIC BLOOD PRESSURE: 102 MMHG

## 2020-06-22 LAB
ALBUMIN SERPL-MCNC: 1.7 G/DL (ref 3.4–5)
ALP SERPL-CCNC: 214 U/L (ref 45–117)
ALT SERPL-CCNC: 27 U/L (ref 16–61)
ANION GAP SERPL CALCULATED.3IONS-SCNC: 4 MMOL/L (ref 5–15)
BILIRUB SERPL-MCNC: 3.3 MG/DL (ref 0.2–1)
BUN SERPL-MCNC: 10 MG/DL (ref 7–18)
BUN/CREAT SERPL: 14.9
CALCIUM SERPL-MCNC: 7.4 MG/DL (ref 8.5–10.1)
CHLORIDE SERPL-SCNC: 107 MMOL/L (ref 98–107)
CO2 SERPL-SCNC: 26 MMOL/L (ref 21–32)
GLUCOSE SERPL-MCNC: 123 MG/DL (ref 74–106)
HCT VFR BLD AUTO: 31.6 % (ref 41–53)
HGB BLD-MCNC: 11 G/DL (ref 13.5–17.5)
MCH RBC QN AUTO: 37.6 PG (ref 28–32)
MCV RBC AUTO: 107.9 FL (ref 80–100)
NRBC BLD QL AUTO: 0.1 %
POTASSIUM SERPL-SCNC: 3.9 MMOL/L (ref 3.5–5.1)
PROT SERPL-MCNC: 6 G/DL (ref 6.4–8.2)
SODIUM SERPL-SCNC: 137 MMOL/L (ref 136–145)

## 2020-06-22 RX ADMIN — PANTOPRAZOLE SODIUM SCH MG: 40 TABLET, DELAYED RELEASE ORAL at 09:46

## 2020-06-22 RX ADMIN — SPIRONOLACTONE SCH MG: 25 TABLET, FILM COATED ORAL at 09:47

## 2020-06-22 RX ADMIN — MULTIVITAMIN TABLET SCH TAB: TABLET at 09:47

## 2020-06-22 RX ADMIN — Medication SCH ML: at 15:19

## 2020-06-22 RX ADMIN — Medication SCH ML: at 09:47

## 2020-06-22 RX ADMIN — ONDANSETRON HYDROCHLORIDE PRN MG: 2 INJECTION, SOLUTION INTRAMUSCULAR; INTRAVENOUS at 20:20

## 2020-06-22 RX ADMIN — PANTOPRAZOLE SODIUM SCH MG: 40 TABLET, DELAYED RELEASE ORAL at 09:53

## 2020-06-22 RX ADMIN — Medication SCH ML: at 20:20

## 2020-06-22 RX ADMIN — Medication SCH ML: at 17:55

## 2020-06-22 RX ADMIN — Medication SCH ML: at 01:32

## 2020-06-22 RX ADMIN — FAMOTIDINE SCH MG: 20 TABLET, FILM COATED ORAL at 09:47

## 2020-06-22 RX ADMIN — SPIRONOLACTONE SCH MG: 25 TABLET, FILM COATED ORAL at 20:21

## 2020-06-22 RX ADMIN — Medication SCH ML: at 05:33

## 2020-06-22 RX ADMIN — PANTOPRAZOLE SODIUM SCH MG: 40 TABLET, DELAYED RELEASE ORAL at 20:25

## 2020-06-22 NOTE — NUR
Opening Shift Note

Assumed care of patient, awake and alert.  No S/S of distress/SOB or pain. Bed in lowest 
locked position, safety precautions in place and call light within reach. Instructed on POC 
and to call for assist PRN, will continue to monitor for changes Q1hr and PRN.

-------------------------------------------------------------------------------

Signed:    06/22/20 at 2339 by TITA VENCES SN <Co-Signature Required>

Co-Signed: 06/22/20 at 2339 by Maty Elizabeth RN RN

-------------------------------------------------------------------------------

## 2020-06-22 NOTE — NUR
2200 SCHEDULE MEDICATION

Protonix scheduled BID, but unable to administer today's dose at scheduled time for 2200 
because day shift RN "non-administered" (see emar). Scheduled dose for today 6/22 at 1000 
administered instead (see emar).



Medications administered early per patient's request.

## 2020-06-23 VITALS — DIASTOLIC BLOOD PRESSURE: 68 MMHG | SYSTOLIC BLOOD PRESSURE: 103 MMHG

## 2020-06-23 VITALS — SYSTOLIC BLOOD PRESSURE: 110 MMHG | DIASTOLIC BLOOD PRESSURE: 74 MMHG

## 2020-06-23 VITALS — DIASTOLIC BLOOD PRESSURE: 66 MMHG | SYSTOLIC BLOOD PRESSURE: 103 MMHG

## 2020-06-23 VITALS — DIASTOLIC BLOOD PRESSURE: 64 MMHG | SYSTOLIC BLOOD PRESSURE: 100 MMHG

## 2020-06-23 VITALS — SYSTOLIC BLOOD PRESSURE: 101 MMHG | DIASTOLIC BLOOD PRESSURE: 69 MMHG

## 2020-06-23 RX ADMIN — Medication SCH ML: at 14:46

## 2020-06-23 RX ADMIN — Medication SCH ML: at 10:20

## 2020-06-23 RX ADMIN — MORPHINE SULFATE PRN MG: 2 INJECTION, SOLUTION INTRAMUSCULAR; INTRAVENOUS at 19:50

## 2020-06-23 RX ADMIN — SPIRONOLACTONE SCH MG: 25 TABLET, FILM COATED ORAL at 10:21

## 2020-06-23 RX ADMIN — PANTOPRAZOLE SODIUM SCH MG: 40 TABLET, DELAYED RELEASE ORAL at 10:21

## 2020-06-23 RX ADMIN — Medication SCH ML: at 21:24

## 2020-06-23 RX ADMIN — FAMOTIDINE SCH MG: 20 TABLET, FILM COATED ORAL at 10:20

## 2020-06-23 RX ADMIN — ONDANSETRON HYDROCHLORIDE PRN MG: 2 INJECTION, SOLUTION INTRAMUSCULAR; INTRAVENOUS at 19:50

## 2020-06-23 RX ADMIN — PANTOPRAZOLE SODIUM SCH MG: 40 TABLET, DELAYED RELEASE ORAL at 21:24

## 2020-06-23 RX ADMIN — MULTIVITAMIN TABLET SCH TAB: TABLET at 10:20

## 2020-06-23 RX ADMIN — Medication SCH ML: at 19:03

## 2020-06-23 RX ADMIN — Medication SCH ML: at 01:52

## 2020-06-23 RX ADMIN — Medication SCH ML: at 05:22

## 2020-06-23 RX ADMIN — FUROSEMIDE SCH MG: 40 TABLET ORAL at 10:21

## 2020-06-23 RX ADMIN — SPIRONOLACTONE SCH MG: 25 TABLET, FILM COATED ORAL at 21:24

## 2020-06-23 NOTE — NUR
Opening Shift Note

Assumed care of patient, awake and alert.  No S/S of distress/SOB or pain.  Insructed on POC 
and to callfor assist PRN, will continue to monitor for changes Q1hr and PRN.

Fall and safety precautions in place. Call light within reach.

## 2020-06-23 NOTE — NUR
Provider at Bedside



Spoke with Dr. Dominguez about pt status update. New orders received for Social Service consult 
regarding finding a PCP for pt, still pending Surgical Consult at this time. Possible 
discharge following surgical consult. Pt's respirations are even and unlabored, no distress 
noted at this time. Pt states, "8/10 pain" to the lower inguinal area. Requesting "Ultram" 
for pain. Will medicate as ordered and continue plan of care. Social Service Consult, 
Parul comer.

-------------------------------------------------------------------------------

Signed:    06/23/20 at 1036 by TITA BENAVIDES SN <Co-Signature Required>

Co-Signed: 06/23/20 at 1036 by Keara Floyd RN

-------------------------------------------------------------------------------

## 2020-06-23 NOTE — NUR
Assessment



Patient is a 49-year-old male who is alert and oriented. Prior to admission patient lived 
home with family and functioned independently. Patient informed me he can care for his own 
ADLs. Patient informed me he was having abdominal pain and was unable to walk and his 
daughter brought him to the hospital. Patient informed me he does not have any medical 
equipment now. Advised patient there is a social service consult for advanced directive 
information. Patient stated he does not need additional information for advanced directive. 
Informed patient he has a right to participate in all discharge planning. Patient verbalized 
understanding. 

-------------------------------------------------------------------------------

Addendum: 06/23/20 at 0902 by BRADLY PUGH

-------------------------------------------------------------------------------

Amended: Links added.

## 2020-06-24 VITALS — SYSTOLIC BLOOD PRESSURE: 105 MMHG | DIASTOLIC BLOOD PRESSURE: 71 MMHG

## 2020-06-24 VITALS — DIASTOLIC BLOOD PRESSURE: 70 MMHG | SYSTOLIC BLOOD PRESSURE: 109 MMHG

## 2020-06-24 VITALS — DIASTOLIC BLOOD PRESSURE: 77 MMHG | SYSTOLIC BLOOD PRESSURE: 113 MMHG

## 2020-06-24 VITALS — DIASTOLIC BLOOD PRESSURE: 62 MMHG | SYSTOLIC BLOOD PRESSURE: 97 MMHG

## 2020-06-24 LAB
HCT VFR BLD AUTO: 34.1 % (ref 41–53)
HGB BLD-MCNC: 11.8 G/DL (ref 13.5–17.5)
INR PPP: 1.74 (ref 0.9–1.15)
MCH RBC QN AUTO: 37.3 PG (ref 28–32)
MCV RBC AUTO: 107.8 FL (ref 80–100)
NRBC BLD QL AUTO: 0.1 %

## 2020-06-24 RX ADMIN — Medication SCH ML: at 05:30

## 2020-06-24 RX ADMIN — Medication SCH ML: at 14:00

## 2020-06-24 RX ADMIN — Medication SCH ML: at 09:57

## 2020-06-24 RX ADMIN — SPIRONOLACTONE SCH MG: 25 TABLET, FILM COATED ORAL at 09:57

## 2020-06-24 RX ADMIN — Medication SCH ML: at 01:59

## 2020-06-24 RX ADMIN — PANTOPRAZOLE SODIUM SCH MG: 40 TABLET, DELAYED RELEASE ORAL at 09:57

## 2020-06-24 RX ADMIN — FAMOTIDINE SCH MG: 20 TABLET, FILM COATED ORAL at 09:57

## 2020-06-24 RX ADMIN — FUROSEMIDE SCH MG: 40 TABLET ORAL at 09:57

## 2020-06-24 RX ADMIN — MULTIVITAMIN TABLET SCH TAB: TABLET at 09:57

## 2020-06-24 NOTE — NUR
D/C Planning 



Per SS consult for safety evaluation and physical therapy. Faxed clinical information to 
[facility] and [facility]. Per Cathy with [facility]  patient 
has been accepted and service to start within 24-48hrs upon d/c day. Obtain authorization 
from [facility] [id number].

## 2020-07-16 ENCOUNTER — HOSPITAL ENCOUNTER (INPATIENT)
Dept: HOSPITAL 15 - ER | Age: 50
LOS: 2 days | Discharge: HOME | DRG: 280 | End: 2020-07-18
Attending: INTERNAL MEDICINE | Admitting: INTERNAL MEDICINE
Payer: MEDICAID

## 2020-07-16 VITALS — SYSTOLIC BLOOD PRESSURE: 92 MMHG | DIASTOLIC BLOOD PRESSURE: 60 MMHG

## 2020-07-16 VITALS — DIASTOLIC BLOOD PRESSURE: 77 MMHG | SYSTOLIC BLOOD PRESSURE: 105 MMHG

## 2020-07-16 VITALS — WEIGHT: 167.55 LBS | BODY MASS INDEX: 32.89 KG/M2 | HEIGHT: 60 IN

## 2020-07-16 DIAGNOSIS — D61.818: ICD-10-CM

## 2020-07-16 DIAGNOSIS — K70.31: Primary | ICD-10-CM

## 2020-07-16 DIAGNOSIS — I10: ICD-10-CM

## 2020-07-16 DIAGNOSIS — N50.89: ICD-10-CM

## 2020-07-16 DIAGNOSIS — J90: ICD-10-CM

## 2020-07-16 DIAGNOSIS — Z79.899: ICD-10-CM

## 2020-07-16 DIAGNOSIS — K57.90: ICD-10-CM

## 2020-07-16 DIAGNOSIS — K40.90: ICD-10-CM

## 2020-07-16 DIAGNOSIS — K80.20: ICD-10-CM

## 2020-07-16 DIAGNOSIS — D68.9: ICD-10-CM

## 2020-07-16 DIAGNOSIS — F32.9: ICD-10-CM

## 2020-07-16 LAB
ALBUMIN SERPL-MCNC: 1.9 G/DL (ref 3.4–5)
ALP SERPL-CCNC: 206 U/L (ref 45–117)
ALT SERPL-CCNC: 29 U/L (ref 16–61)
ANION GAP SERPL CALCULATED.3IONS-SCNC: 6 MMOL/L (ref 5–15)
APTT PPP: 40.2 SEC (ref 23.64–32.05)
BILIRUB SERPL-MCNC: 3.6 MG/DL (ref 0.2–1)
BUN SERPL-MCNC: 9 MG/DL (ref 7–18)
BUN/CREAT SERPL: 11.1
CALCIUM SERPL-MCNC: 8 MG/DL (ref 8.5–10.1)
CHLORIDE SERPL-SCNC: 105 MMOL/L (ref 98–107)
CO2 SERPL-SCNC: 25 MMOL/L (ref 21–32)
GLUCOSE SERPL-MCNC: 102 MG/DL (ref 74–106)
HCT VFR BLD AUTO: 36.2 % (ref 41–53)
HGB BLD-MCNC: 12.5 G/DL (ref 13.5–17.5)
INR PPP: 1.78 (ref 0.9–1.15)
MCH RBC QN AUTO: 37.4 PG (ref 28–32)
MCV RBC AUTO: 108.7 FL (ref 80–100)
POTASSIUM SERPL-SCNC: 4.3 MMOL/L (ref 3.5–5.1)
PROT SERPL-MCNC: 6.6 G/DL (ref 6.4–8.2)
SODIUM SERPL-SCNC: 136 MMOL/L (ref 136–145)

## 2020-07-16 PROCEDURE — 80053 COMPREHEN METABOLIC PANEL: CPT

## 2020-07-16 PROCEDURE — 76700 US EXAM ABDOM COMPLETE: CPT

## 2020-07-16 PROCEDURE — 74176 CT ABD & PELVIS W/O CONTRAST: CPT

## 2020-07-16 PROCEDURE — 36415 COLL VENOUS BLD VENIPUNCTURE: CPT

## 2020-07-16 PROCEDURE — 85610 PROTHROMBIN TIME: CPT

## 2020-07-16 PROCEDURE — 10022: CPT

## 2020-07-16 PROCEDURE — 87081 CULTURE SCREEN ONLY: CPT

## 2020-07-16 PROCEDURE — 85027 COMPLETE CBC AUTOMATED: CPT

## 2020-07-16 PROCEDURE — 85007 BL SMEAR W/DIFF WBC COUNT: CPT

## 2020-07-16 PROCEDURE — 82140 ASSAY OF AMMONIA: CPT

## 2020-07-16 PROCEDURE — 71045 X-RAY EXAM CHEST 1 VIEW: CPT

## 2020-07-16 PROCEDURE — 93005 ELECTROCARDIOGRAM TRACING: CPT

## 2020-07-16 PROCEDURE — 97163 PT EVAL HIGH COMPLEX 45 MIN: CPT

## 2020-07-16 PROCEDURE — 0W9G3ZZ DRAINAGE OF PERITONEAL CAVITY, PERCUTANEOUS APPROACH: ICD-10-PCS | Performed by: INTERNAL MEDICINE

## 2020-07-16 PROCEDURE — 76942 ECHO GUIDE FOR BIOPSY: CPT

## 2020-07-16 PROCEDURE — 94640 AIRWAY INHALATION TREATMENT: CPT

## 2020-07-16 PROCEDURE — 85730 THROMBOPLASTIN TIME PARTIAL: CPT

## 2020-07-16 PROCEDURE — 76870 US EXAM SCROTUM: CPT

## 2020-07-16 PROCEDURE — 85025 COMPLETE CBC W/AUTO DIFF WBC: CPT

## 2020-07-16 PROCEDURE — 94660 CPAP INITIATION&MGMT: CPT

## 2020-07-16 PROCEDURE — 81001 URINALYSIS AUTO W/SCOPE: CPT

## 2020-07-16 PROCEDURE — 49082 ABD PARACENTESIS: CPT

## 2020-07-16 RX ADMIN — FAMOTIDINE SCH MG: 20 TABLET, FILM COATED ORAL at 21:40

## 2020-07-16 RX ADMIN — SPIRONOLACTONE SCH MG: 25 TABLET, FILM COATED ORAL at 18:21

## 2020-07-16 RX ADMIN — FUROSEMIDE SCH MG: 10 INJECTION, SOLUTION INTRAMUSCULAR; INTRAVENOUS at 18:21

## 2020-07-16 NOTE — NUR
PARACENTESIS DONE BY DR LEGER IN ULTRASOUND. /[id number]%. 1515: 110/74-69-16-98%. 
FINISHED WITH PROCEDURE. 2400 ML OF ASCITIC FLUID REMOVED. NOT SENT TO LAB.

## 2020-07-16 NOTE — NUR
Telemetry admit from ER

JEOVANY FELIX admitted to Telemetry unit after SBAR received.  Patient oriented to Leticia V Reyes, primary RN, unit, room, bed, and unit policies regarding patient care and visiting 
hours. Patient now on continuous telemetry monitoring, tele box # 34 and telemetry reading 
on arrival to unit is SINUS RHYTHM AT 73BPM. Patient placed on bedside oxygen, weighed by 
bedscale and encouraged to call if they need something. All questions and concerns 
addressed, patient verbalized understanding.

## 2020-07-17 VITALS — SYSTOLIC BLOOD PRESSURE: 102 MMHG | DIASTOLIC BLOOD PRESSURE: 66 MMHG

## 2020-07-17 VITALS — SYSTOLIC BLOOD PRESSURE: 102 MMHG | DIASTOLIC BLOOD PRESSURE: 70 MMHG

## 2020-07-17 VITALS — SYSTOLIC BLOOD PRESSURE: 92 MMHG | DIASTOLIC BLOOD PRESSURE: 60 MMHG

## 2020-07-17 VITALS — SYSTOLIC BLOOD PRESSURE: 97 MMHG | DIASTOLIC BLOOD PRESSURE: 65 MMHG

## 2020-07-17 VITALS — SYSTOLIC BLOOD PRESSURE: 108 MMHG | DIASTOLIC BLOOD PRESSURE: 68 MMHG

## 2020-07-17 VITALS — DIASTOLIC BLOOD PRESSURE: 60 MMHG | SYSTOLIC BLOOD PRESSURE: 93 MMHG

## 2020-07-17 LAB
ALBUMIN SERPL-MCNC: 1.7 G/DL (ref 3.4–5)
ALP SERPL-CCNC: 165 U/L (ref 45–117)
ALT SERPL-CCNC: 24 U/L (ref 16–61)
ANION GAP SERPL CALCULATED.3IONS-SCNC: 5 MMOL/L (ref 5–15)
BILIRUB SERPL-MCNC: 3.9 MG/DL (ref 0.2–1)
BUN SERPL-MCNC: 9 MG/DL (ref 7–18)
BUN/CREAT SERPL: 16.1
CALCIUM SERPL-MCNC: 8.1 MG/DL (ref 8.5–10.1)
CHLORIDE SERPL-SCNC: 106 MMOL/L (ref 98–107)
CO2 SERPL-SCNC: 25 MMOL/L (ref 21–32)
GLUCOSE SERPL-MCNC: 83 MG/DL (ref 74–106)
HCT VFR BLD AUTO: 34.7 % (ref 41–53)
HGB BLD-MCNC: 12.1 G/DL (ref 13.5–17.5)
MCH RBC QN AUTO: 37.6 PG (ref 28–32)
MCV RBC AUTO: 108.2 FL (ref 80–100)
NRBC BLD QL AUTO: 0.3 %
POTASSIUM SERPL-SCNC: 4.3 MMOL/L (ref 3.5–5.1)
PROT SERPL-MCNC: 6.1 G/DL (ref 6.4–8.2)
SODIUM SERPL-SCNC: 136 MMOL/L (ref 136–145)

## 2020-07-17 RX ADMIN — Medication SCH ML: at 21:49

## 2020-07-17 RX ADMIN — IPRATROPIUM BROMIDE SCH MG: 0.5 SOLUTION RESPIRATORY (INHALATION) at 12:26

## 2020-07-17 RX ADMIN — IPRATROPIUM BROMIDE SCH MG: 0.5 SOLUTION RESPIRATORY (INHALATION) at 18:44

## 2020-07-17 RX ADMIN — SPIRONOLACTONE SCH MG: 25 TABLET, FILM COATED ORAL at 05:55

## 2020-07-17 RX ADMIN — ALBUTEROL SULFATE SCH MG: 2.5 SOLUTION RESPIRATORY (INHALATION) at 00:52

## 2020-07-17 RX ADMIN — FAMOTIDINE SCH MG: 20 TABLET, FILM COATED ORAL at 21:49

## 2020-07-17 RX ADMIN — IPRATROPIUM BROMIDE SCH MG: 0.5 SOLUTION RESPIRATORY (INHALATION) at 07:18

## 2020-07-17 RX ADMIN — ALBUTEROL SULFATE SCH MG: 2.5 SOLUTION RESPIRATORY (INHALATION) at 18:44

## 2020-07-17 RX ADMIN — IPRATROPIUM BROMIDE SCH MG: 0.5 SOLUTION RESPIRATORY (INHALATION) at 00:52

## 2020-07-17 RX ADMIN — POTASSIUM BICARBONATE SCH MEQ: 978 TABLET, EFFERVESCENT ORAL at 21:49

## 2020-07-17 RX ADMIN — ALBUTEROL SULFATE SCH MG: 2.5 SOLUTION RESPIRATORY (INHALATION) at 07:18

## 2020-07-17 RX ADMIN — FUROSEMIDE SCH MG: 10 INJECTION, SOLUTION INTRAMUSCULAR; INTRAVENOUS at 05:54

## 2020-07-17 RX ADMIN — FUROSEMIDE SCH MG: 20 TABLET ORAL at 18:10

## 2020-07-17 RX ADMIN — ALBUTEROL SULFATE SCH MG: 2.5 SOLUTION RESPIRATORY (INHALATION) at 12:26

## 2020-07-17 RX ADMIN — Medication SCH ML: at 14:01

## 2020-07-17 RX ADMIN — FAMOTIDINE SCH MG: 20 TABLET, FILM COATED ORAL at 11:08

## 2020-07-17 NOTE — NUR
Opening Shift Note

Assumed care of patient, awake and alert.  No S/S of distress/SOB or pain. Bed is locked in 
lowest position with call light within reach. Instructed on POC and to call for assist PRN, 
will continue to monitor for changes Q1hr and PRN.

## 2020-07-17 NOTE — NUR
Patient noted to be aaox4, pleasant and cooperative. Patient voiced some discomfort on the 
paracentesis puncture site but no bleeding/ discharge noted on the site. Shift assessment 
done and charted. Will continue to monitor patient.

## 2020-07-18 VITALS — DIASTOLIC BLOOD PRESSURE: 64 MMHG | SYSTOLIC BLOOD PRESSURE: 102 MMHG

## 2020-07-18 VITALS — DIASTOLIC BLOOD PRESSURE: 59 MMHG | SYSTOLIC BLOOD PRESSURE: 99 MMHG

## 2020-07-18 VITALS — SYSTOLIC BLOOD PRESSURE: 96 MMHG | DIASTOLIC BLOOD PRESSURE: 70 MMHG

## 2020-07-18 LAB
ALBUMIN SERPL-MCNC: 1.7 G/DL (ref 3.4–5)
ALP SERPL-CCNC: 172 U/L (ref 45–117)
ALT SERPL-CCNC: 23 U/L (ref 16–61)
ANION GAP SERPL CALCULATED.3IONS-SCNC: 4 MMOL/L (ref 5–15)
BILIRUB SERPL-MCNC: 3.4 MG/DL (ref 0.2–1)
BUN SERPL-MCNC: 10 MG/DL (ref 7–18)
BUN/CREAT SERPL: 14.9
CALCIUM SERPL-MCNC: 8 MG/DL (ref 8.5–10.1)
CHLORIDE SERPL-SCNC: 104 MMOL/L (ref 98–107)
CO2 SERPL-SCNC: 27 MMOL/L (ref 21–32)
GLUCOSE SERPL-MCNC: 87 MG/DL (ref 74–106)
HCT VFR BLD AUTO: 34.1 % (ref 41–53)
HGB BLD-MCNC: 11.8 G/DL (ref 13.5–17.5)
MCH RBC QN AUTO: 37.6 PG (ref 28–32)
MCV RBC AUTO: 108.4 FL (ref 80–100)
POTASSIUM SERPL-SCNC: 4.6 MMOL/L (ref 3.5–5.1)
PROT SERPL-MCNC: 6.1 G/DL (ref 6.4–8.2)
SODIUM SERPL-SCNC: 135 MMOL/L (ref 136–145)

## 2020-07-18 RX ADMIN — IPRATROPIUM BROMIDE SCH MG: 0.5 SOLUTION RESPIRATORY (INHALATION) at 00:10

## 2020-07-18 RX ADMIN — FUROSEMIDE SCH MG: 20 TABLET ORAL at 05:53

## 2020-07-18 RX ADMIN — IPRATROPIUM BROMIDE SCH MG: 0.5 SOLUTION RESPIRATORY (INHALATION) at 06:25

## 2020-07-18 RX ADMIN — POTASSIUM BICARBONATE SCH MEQ: 978 TABLET, EFFERVESCENT ORAL at 09:55

## 2020-07-18 RX ADMIN — IPRATROPIUM BROMIDE SCH MG: 0.5 SOLUTION RESPIRATORY (INHALATION) at 11:04

## 2020-07-18 RX ADMIN — Medication SCH ML: at 05:53

## 2020-07-18 RX ADMIN — ALBUTEROL SULFATE SCH MG: 2.5 SOLUTION RESPIRATORY (INHALATION) at 11:04

## 2020-07-18 RX ADMIN — ALBUTEROL SULFATE SCH MG: 2.5 SOLUTION RESPIRATORY (INHALATION) at 00:10

## 2020-07-18 RX ADMIN — ALBUTEROL SULFATE SCH MG: 2.5 SOLUTION RESPIRATORY (INHALATION) at 06:25

## 2020-07-18 RX ADMIN — FAMOTIDINE SCH MG: 20 TABLET, FILM COATED ORAL at 09:55

## 2020-07-18 RX ADMIN — Medication SCH ML: at 13:51

## 2020-07-18 NOTE — NUR
Discharge instructions given as ordered. Encourage to follow up with PMD as instructed. All 
questions and concerns addressed. Patient verbalized understanding. IV removed with catheter 
intact, pressure dressing applied.  Telemetry unit returned to ICU. Patient taken to vehicle 
via wheelchair with all personal belongings, accompanied by staff. No distress noted at time 
of departure.

## 2020-07-18 NOTE — NUR
OPENING SHIFT NOTE

ASSUMED CARE OF PATIENT AWAKE AND ALERT. NO S/S OF DISTRESS NOTED OR COMPLAINTS OF PAIN. 
PATIENT UPDATED ON POC FOR THE DAY AND ALL QUESTIONS ANSWERED. BED IS IN LOWEST, LOCKED 
POSITION WITH SIDE RAILS UP X2 AND CALL LIGHT WITHIN REACH. WILL CONTINUE TO MONITOR Q1H AND 
PRN.

## 2020-07-18 NOTE — NUR
REGARDING DISCHARGE

PER DR CHAUDHRY IT IS OK TO DISCHARGE PATIENT WITHOUT HOME HEALTH BEING SET UP AS  
ON CALL HAS NOT RESPONDED TO NUMEROUS PAGES.

## 2020-07-20 NOTE — NUR
No call or page received from Carlotta regarding home health order on the 17th or 18th.  Will 
followup with Audrey at [facility] regarding authorization and [facility] for admission 
onto service.

## 2020-07-22 ENCOUNTER — HOSPITAL ENCOUNTER (EMERGENCY)
Dept: HOSPITAL 15 - ER | Age: 50
LOS: 1 days | Discharge: LEFT BEFORE DISCHARGE | End: 2020-07-23
Payer: MEDICAID

## 2020-07-22 VITALS — SYSTOLIC BLOOD PRESSURE: 106 MMHG | DIASTOLIC BLOOD PRESSURE: 65 MMHG

## 2020-07-22 VITALS — BODY MASS INDEX: 27.48 KG/M2 | WEIGHT: 140 LBS | HEIGHT: 60 IN

## 2020-07-22 DIAGNOSIS — R42: Primary | ICD-10-CM

## 2020-07-22 DIAGNOSIS — Z53.21: ICD-10-CM

## 2020-07-22 DIAGNOSIS — R41.0: ICD-10-CM

## 2020-07-22 LAB
ALBUMIN SERPL-MCNC: 2.2 G/DL (ref 3.4–5)
ALP SERPL-CCNC: 244 U/L (ref 45–117)
ALT SERPL-CCNC: 32 U/L (ref 16–61)
AMYLASE SERPL-CCNC: 100 U/L (ref 25–115)
ANION GAP SERPL CALCULATED.3IONS-SCNC: 7 MMOL/L (ref 5–15)
APTT PPP: 36.7 SEC (ref 23.64–32.05)
BILIRUB SERPL-MCNC: 4.8 MG/DL (ref 0.2–1)
BUN SERPL-MCNC: 10 MG/DL (ref 7–18)
BUN/CREAT SERPL: 13.3
CALCIUM SERPL-MCNC: 8.5 MG/DL (ref 8.5–10.1)
CHLORIDE SERPL-SCNC: 98 MMOL/L (ref 98–107)
CO2 SERPL-SCNC: 24 MMOL/L (ref 21–32)
GLUCOSE SERPL-MCNC: 121 MG/DL (ref 74–106)
HCT VFR BLD AUTO: 41.2 % (ref 41–53)
HGB BLD-MCNC: 14.3 G/DL (ref 13.5–17.5)
INR PPP: 1.68 (ref 0.9–1.15)
LIPASE SERPL-CCNC: 135 U/L (ref 73–393)
MCH RBC QN AUTO: 37.4 PG (ref 28–32)
MCV RBC AUTO: 108 FL (ref 80–100)
NRBC BLD QL AUTO: 0.1 %
POTASSIUM SERPL-SCNC: 3.8 MMOL/L (ref 3.5–5.1)
PROT SERPL-MCNC: 7.7 G/DL (ref 6.4–8.2)
SODIUM SERPL-SCNC: 129 MMOL/L (ref 136–145)

## 2020-07-22 PROCEDURE — 83690 ASSAY OF LIPASE: CPT

## 2020-07-22 PROCEDURE — 70450 CT HEAD/BRAIN W/O DYE: CPT

## 2020-07-22 PROCEDURE — 85025 COMPLETE CBC W/AUTO DIFF WBC: CPT

## 2020-07-22 PROCEDURE — 36415 COLL VENOUS BLD VENIPUNCTURE: CPT

## 2020-07-22 PROCEDURE — 80320 DRUG SCREEN QUANTALCOHOLS: CPT

## 2020-07-22 PROCEDURE — 82150 ASSAY OF AMYLASE: CPT

## 2020-07-22 PROCEDURE — 82140 ASSAY OF AMMONIA: CPT

## 2020-07-22 PROCEDURE — 85730 THROMBOPLASTIN TIME PARTIAL: CPT

## 2020-07-22 PROCEDURE — 80053 COMPREHEN METABOLIC PANEL: CPT

## 2020-07-22 PROCEDURE — 85610 PROTHROMBIN TIME: CPT

## 2020-09-17 NOTE — NUR
Chest tube drainage at 570ml at 0300. Will continue to monitor. Additional Notes: After discussion of the risks, benefits and limitations with respect to this Telehealth visit, including potential limitations in picture and video quality, the patient has given verbal consent to proceed with this Telehealth visit. Interactive audio and video telecommunications were used to permit real-time communication between myself and the patient.  This Telehealth visit was performed due to the national COVID-19 emergency and recommended social distancing. Detail Level: Simple

## 2020-09-19 ENCOUNTER — HOSPITAL ENCOUNTER (EMERGENCY)
Dept: HOSPITAL 15 - ER | Age: 50
Discharge: HOME | End: 2020-09-19
Payer: MEDICAID

## 2020-09-19 VITALS — SYSTOLIC BLOOD PRESSURE: 99 MMHG | DIASTOLIC BLOOD PRESSURE: 62 MMHG

## 2020-09-19 VITALS — HEIGHT: 63 IN | BODY MASS INDEX: 26.58 KG/M2 | WEIGHT: 150 LBS

## 2020-09-19 DIAGNOSIS — N39.0: ICD-10-CM

## 2020-09-19 DIAGNOSIS — N43.3: Primary | ICD-10-CM

## 2020-09-19 DIAGNOSIS — K70.31: ICD-10-CM

## 2020-09-19 DIAGNOSIS — E43: ICD-10-CM

## 2020-09-19 DIAGNOSIS — I10: ICD-10-CM

## 2020-09-19 LAB
ALBUMIN SERPL-MCNC: 2.2 G/DL (ref 3.4–5)
ALP SERPL-CCNC: 221 U/L (ref 45–117)
ALT SERPL-CCNC: 23 U/L (ref 16–61)
ANION GAP SERPL CALCULATED.3IONS-SCNC: 4 MMOL/L (ref 5–15)
BILIRUB SERPL-MCNC: 4 MG/DL (ref 0.2–1)
BUN SERPL-MCNC: 9 MG/DL (ref 7–18)
BUN/CREAT SERPL: 11.8
CALCIUM SERPL-MCNC: 8.1 MG/DL (ref 8.5–10.1)
CHLORIDE SERPL-SCNC: 103 MMOL/L (ref 98–107)
CO2 SERPL-SCNC: 27 MMOL/L (ref 21–32)
GLUCOSE SERPL-MCNC: 161 MG/DL (ref 74–106)
HCT VFR BLD AUTO: 40.7 % (ref 41–53)
HGB BLD-MCNC: 14.2 G/DL (ref 13.5–17.5)
MCH RBC QN AUTO: 36.9 PG (ref 28–32)
MCV RBC AUTO: 105.4 FL (ref 80–100)
NRBC BLD QL AUTO: 0.2 %
POTASSIUM SERPL-SCNC: 3.5 MMOL/L (ref 3.5–5.1)
PROT SERPL-MCNC: 7.2 G/DL (ref 6.4–8.2)
SODIUM SERPL-SCNC: 134 MMOL/L (ref 136–145)

## 2020-09-19 PROCEDURE — 36415 COLL VENOUS BLD VENIPUNCTURE: CPT

## 2020-09-19 PROCEDURE — 76870 US EXAM SCROTUM: CPT

## 2020-09-19 PROCEDURE — 76705 ECHO EXAM OF ABDOMEN: CPT

## 2020-09-19 PROCEDURE — 96374 THER/PROPH/DIAG INJ IV PUSH: CPT

## 2020-09-19 PROCEDURE — 85025 COMPLETE CBC W/AUTO DIFF WBC: CPT

## 2020-09-19 PROCEDURE — 82140 ASSAY OF AMMONIA: CPT

## 2020-09-19 PROCEDURE — 80053 COMPREHEN METABOLIC PANEL: CPT

## 2020-09-19 PROCEDURE — 99285 EMERGENCY DEPT VISIT HI MDM: CPT

## 2020-09-20 ENCOUNTER — HOSPITAL ENCOUNTER (INPATIENT)
Dept: HOSPITAL 15 - ER | Age: 50
LOS: 2 days | Discharge: HOME | DRG: 280 | End: 2020-09-22
Attending: INTERNAL MEDICINE | Admitting: INTERNAL MEDICINE
Payer: MEDICAID

## 2020-09-20 VITALS — DIASTOLIC BLOOD PRESSURE: 61 MMHG | SYSTOLIC BLOOD PRESSURE: 94 MMHG

## 2020-09-20 VITALS — BODY MASS INDEX: 29.61 KG/M2 | HEIGHT: 63 IN | WEIGHT: 167.11 LBS

## 2020-09-20 VITALS — SYSTOLIC BLOOD PRESSURE: 94 MMHG | DIASTOLIC BLOOD PRESSURE: 61 MMHG

## 2020-09-20 DIAGNOSIS — Z79.899: ICD-10-CM

## 2020-09-20 DIAGNOSIS — E44.1: ICD-10-CM

## 2020-09-20 DIAGNOSIS — M54.5: ICD-10-CM

## 2020-09-20 DIAGNOSIS — K72.90: ICD-10-CM

## 2020-09-20 DIAGNOSIS — I10: ICD-10-CM

## 2020-09-20 DIAGNOSIS — F32.9: ICD-10-CM

## 2020-09-20 DIAGNOSIS — D61.818: ICD-10-CM

## 2020-09-20 DIAGNOSIS — K70.31: Primary | ICD-10-CM

## 2020-09-20 DIAGNOSIS — N43.3: ICD-10-CM

## 2020-09-20 DIAGNOSIS — E88.09: ICD-10-CM

## 2020-09-20 DIAGNOSIS — G89.29: ICD-10-CM

## 2020-09-20 DIAGNOSIS — D68.9: ICD-10-CM

## 2020-09-20 DIAGNOSIS — N50.89: ICD-10-CM

## 2020-09-20 DIAGNOSIS — K40.90: ICD-10-CM

## 2020-09-20 DIAGNOSIS — I95.9: ICD-10-CM

## 2020-09-20 DIAGNOSIS — N39.0: ICD-10-CM

## 2020-09-20 LAB
ALBUMIN SERPL-MCNC: 1.7 G/DL (ref 3.4–5)
ALP SERPL-CCNC: 144 U/L (ref 45–117)
ALT SERPL-CCNC: 17 U/L (ref 16–61)
ANION GAP SERPL CALCULATED.3IONS-SCNC: 8 MMOL/L (ref 5–15)
BILIRUB SERPL-MCNC: 5.1 MG/DL (ref 0.2–1)
BUN SERPL-MCNC: 12 MG/DL (ref 7–18)
BUN/CREAT SERPL: 13.3
CALCIUM SERPL-MCNC: 7.4 MG/DL (ref 8.5–10.1)
CHLORIDE SERPL-SCNC: 101 MMOL/L (ref 98–107)
CO2 SERPL-SCNC: 25 MMOL/L (ref 21–32)
GLUCOSE SERPL-MCNC: 194 MG/DL (ref 74–106)
HCT VFR BLD AUTO: 29.6 % (ref 41–53)
HGB BLD-MCNC: 10.4 G/DL (ref 13.5–17.5)
MCH RBC QN AUTO: 37.3 PG (ref 28–32)
MCV RBC AUTO: 106.3 FL (ref 80–100)
NRBC BLD QL AUTO: 0 %
POTASSIUM SERPL-SCNC: 3.6 MMOL/L (ref 3.5–5.1)
PROT SERPL-MCNC: 5.6 G/DL (ref 6.4–8.2)
SODIUM SERPL-SCNC: 134 MMOL/L (ref 136–145)

## 2020-09-20 PROCEDURE — 36415 COLL VENOUS BLD VENIPUNCTURE: CPT

## 2020-09-20 PROCEDURE — 99291 CRITICAL CARE FIRST HOUR: CPT

## 2020-09-20 PROCEDURE — 76942 ECHO GUIDE FOR BIOPSY: CPT

## 2020-09-20 PROCEDURE — 81001 URINALYSIS AUTO W/SCOPE: CPT

## 2020-09-20 PROCEDURE — 49083 ABD PARACENTESIS W/IMAGING: CPT

## 2020-09-20 PROCEDURE — 80048 BASIC METABOLIC PNL TOTAL CA: CPT

## 2020-09-20 PROCEDURE — 82140 ASSAY OF AMMONIA: CPT

## 2020-09-20 PROCEDURE — 80053 COMPREHEN METABOLIC PANEL: CPT

## 2020-09-20 PROCEDURE — 85025 COMPLETE CBC W/AUTO DIFF WBC: CPT

## 2020-09-20 PROCEDURE — 85730 THROMBOPLASTIN TIME PARTIAL: CPT

## 2020-09-20 PROCEDURE — 83735 ASSAY OF MAGNESIUM: CPT

## 2020-09-20 PROCEDURE — 76700 US EXAM ABDOM COMPLETE: CPT

## 2020-09-20 PROCEDURE — 10022: CPT

## 2020-09-20 PROCEDURE — 87081 CULTURE SCREEN ONLY: CPT

## 2020-09-20 PROCEDURE — 85610 PROTHROMBIN TIME: CPT

## 2020-09-20 RX ADMIN — MORPHINE SULFATE PRN MG: 2 INJECTION, SOLUTION INTRAMUSCULAR; INTRAVENOUS at 21:25

## 2020-09-21 VITALS — DIASTOLIC BLOOD PRESSURE: 63 MMHG | SYSTOLIC BLOOD PRESSURE: 102 MMHG

## 2020-09-21 VITALS — DIASTOLIC BLOOD PRESSURE: 59 MMHG | SYSTOLIC BLOOD PRESSURE: 98 MMHG

## 2020-09-21 VITALS — SYSTOLIC BLOOD PRESSURE: 101 MMHG | DIASTOLIC BLOOD PRESSURE: 60 MMHG

## 2020-09-21 VITALS — SYSTOLIC BLOOD PRESSURE: 107 MMHG | DIASTOLIC BLOOD PRESSURE: 63 MMHG

## 2020-09-21 VITALS — SYSTOLIC BLOOD PRESSURE: 93 MMHG | DIASTOLIC BLOOD PRESSURE: 69 MMHG

## 2020-09-21 LAB
ANION GAP SERPL CALCULATED.3IONS-SCNC: 4 MMOL/L (ref 5–15)
APTT PPP: 49.6 SEC (ref 23–31.2)
BUN SERPL-MCNC: 10 MG/DL (ref 7–18)
BUN/CREAT SERPL: 18.9
CALCIUM SERPL-MCNC: 7.8 MG/DL (ref 8.5–10.1)
CHLORIDE SERPL-SCNC: 108 MMOL/L (ref 98–107)
CO2 SERPL-SCNC: 26 MMOL/L (ref 21–32)
GLUCOSE SERPL-MCNC: 87 MG/DL (ref 74–106)
HCT VFR BLD AUTO: 25.9 % (ref 41–53)
HGB BLD-MCNC: 9.2 G/DL (ref 13.5–17.5)
INR PPP: 1.95 (ref 0.9–1.15)
MAGNESIUM SERPL-MCNC: 2.2 MG/DL (ref 1.6–2.6)
MCH RBC QN AUTO: 37.4 PG (ref 28–32)
MCV RBC AUTO: 105 FL (ref 80–100)
NRBC BLD QL AUTO: 0.3 %
POTASSIUM SERPL-SCNC: 3.5 MMOL/L (ref 3.5–5.1)
SODIUM SERPL-SCNC: 138 MMOL/L (ref 136–145)

## 2020-09-21 RX ADMIN — MORPHINE SULFATE PRN MG: 2 INJECTION, SOLUTION INTRAMUSCULAR; INTRAVENOUS at 04:26

## 2020-09-21 RX ADMIN — CEFTRIAXONE SODIUM SCH MLS/HR: 1 INJECTION, POWDER, FOR SOLUTION INTRAMUSCULAR; INTRAVENOUS at 22:00

## 2020-09-22 VITALS — SYSTOLIC BLOOD PRESSURE: 117 MMHG | DIASTOLIC BLOOD PRESSURE: 68 MMHG

## 2020-09-22 VITALS — SYSTOLIC BLOOD PRESSURE: 111 MMHG | DIASTOLIC BLOOD PRESSURE: 67 MMHG

## 2020-09-22 VITALS — DIASTOLIC BLOOD PRESSURE: 63 MMHG | SYSTOLIC BLOOD PRESSURE: 101 MMHG

## 2020-09-22 VITALS — DIASTOLIC BLOOD PRESSURE: 67 MMHG | SYSTOLIC BLOOD PRESSURE: 101 MMHG

## 2020-09-22 PROCEDURE — 0W9G3ZZ DRAINAGE OF PERITONEAL CAVITY, PERCUTANEOUS APPROACH: ICD-10-PCS | Performed by: RADIOLOGY

## 2020-09-22 RX ADMIN — CEFTRIAXONE SODIUM SCH MLS/HR: 1 INJECTION, POWDER, FOR SOLUTION INTRAMUSCULAR; INTRAVENOUS at 09:37

## 2021-10-26 ENCOUNTER — HOSPITAL ENCOUNTER (INPATIENT)
Dept: HOSPITAL 15 - ER | Age: 51
LOS: 11 days | Discharge: HOSPICE | DRG: 280 | End: 2021-11-06
Attending: HOSPITALIST | Admitting: HOSPITALIST
Payer: MEDICAID

## 2021-10-26 VITALS — WEIGHT: 199.96 LBS | BODY MASS INDEX: 31.38 KG/M2 | HEIGHT: 67 IN

## 2021-10-26 DIAGNOSIS — J98.11: ICD-10-CM

## 2021-10-26 DIAGNOSIS — R33.8: ICD-10-CM

## 2021-10-26 DIAGNOSIS — K21.9: ICD-10-CM

## 2021-10-26 DIAGNOSIS — E87.2: ICD-10-CM

## 2021-10-26 DIAGNOSIS — K76.7: ICD-10-CM

## 2021-10-26 DIAGNOSIS — K72.10: ICD-10-CM

## 2021-10-26 DIAGNOSIS — E87.6: ICD-10-CM

## 2021-10-26 DIAGNOSIS — K40.90: ICD-10-CM

## 2021-10-26 DIAGNOSIS — J90: ICD-10-CM

## 2021-10-26 DIAGNOSIS — K85.90: ICD-10-CM

## 2021-10-26 DIAGNOSIS — Z51.5: ICD-10-CM

## 2021-10-26 DIAGNOSIS — D64.9: ICD-10-CM

## 2021-10-26 DIAGNOSIS — N43.3: ICD-10-CM

## 2021-10-26 DIAGNOSIS — Z20.822: ICD-10-CM

## 2021-10-26 DIAGNOSIS — N40.1: ICD-10-CM

## 2021-10-26 DIAGNOSIS — E88.09: ICD-10-CM

## 2021-10-26 DIAGNOSIS — K72.90: ICD-10-CM

## 2021-10-26 DIAGNOSIS — D68.4: ICD-10-CM

## 2021-10-26 DIAGNOSIS — E87.1: ICD-10-CM

## 2021-10-26 DIAGNOSIS — K70.31: Primary | ICD-10-CM

## 2021-10-26 DIAGNOSIS — Z79.899: ICD-10-CM

## 2021-10-26 DIAGNOSIS — N17.0: ICD-10-CM

## 2021-10-26 DIAGNOSIS — D69.59: ICD-10-CM

## 2021-10-26 DIAGNOSIS — I10: ICD-10-CM

## 2021-10-26 LAB
ALBUMIN SERPL-MCNC: 2.1 G/DL (ref 3.4–5)
ALCOHOL, URINE: < 3 MG/DL (ref 0–10)
ALP SERPL-CCNC: 214 U/L (ref 45–117)
ALT SERPL-CCNC: 32 U/L (ref 16–61)
AMPHETAMINES UR QL SCN: NEGATIVE
ANION GAP SERPL CALCULATED.3IONS-SCNC: 16 MMOL/L (ref 5–15)
ANION GAP SERPL CALCULATED.3IONS-SCNC: 17 MMOL/L (ref 5–15)
APTT PPP: 37.1 SEC (ref 23.6–33)
BARBITURATES UR QL SCN: NEGATIVE
BENZODIAZ UR QL SCN: NEGATIVE
BILIRUB SERPL-MCNC: 3.4 MG/DL (ref 0.2–1)
BUN SERPL-MCNC: 106 MG/DL (ref 7–18)
BUN SERPL-MCNC: 106 MG/DL (ref 7–18)
BUN/CREAT SERPL: 18
BUN/CREAT SERPL: 18.3
BZE UR QL SCN: NEGATIVE
CALCIUM SERPL-MCNC: 7.7 MG/DL (ref 8.5–10.1)
CALCIUM SERPL-MCNC: 7.9 MG/DL (ref 8.5–10.1)
CANNABINOIDS UR QL SCN: NEGATIVE
CHLORIDE SERPL-SCNC: 95 MMOL/L (ref 98–107)
CHLORIDE SERPL-SCNC: 98 MMOL/L (ref 98–107)
CO2 SERPL-SCNC: 10 MMOL/L (ref 21–32)
CO2 SERPL-SCNC: 14 MMOL/L (ref 21–32)
GLUCOSE SERPL-MCNC: 143 MG/DL (ref 74–106)
GLUCOSE SERPL-MCNC: 94 MG/DL (ref 74–106)
HCT VFR BLD AUTO: 29.3 % (ref 41–53)
HGB BLD-MCNC: 10.3 G/DL (ref 13.5–17.5)
INR PPP: 1.54 (ref 0.9–1.15)
LACTATE PLASV-SCNC: 4.8 MMOL/L (ref 0.4–2)
LIPASE SERPL-CCNC: 526 U/L (ref 73–393)
MAGNESIUM SERPL-MCNC: 4 MG/DL (ref 1.6–2.6)
MCH RBC QN AUTO: 36.6 PG (ref 28–32)
MCV RBC AUTO: 104.6 FL (ref 80–100)
NRBC BLD QL AUTO: 0.2 %
OPIATES UR QL SCN: NEGATIVE
PCP UR QL SCN: NEGATIVE
POTASSIUM SERPL-SCNC: 3.7 MMOL/L (ref 3.5–5.1)
POTASSIUM SERPL-SCNC: 4 MMOL/L (ref 3.5–5.1)
PROT SERPL-MCNC: 5.7 G/DL (ref 6.4–8.2)
PROT UR-MCNC: 36.7 MG/DL (ref 0–11.9)
SODIUM SERPL-SCNC: 124 MMOL/L (ref 136–145)
SODIUM SERPL-SCNC: 126 MMOL/L (ref 136–145)

## 2021-10-26 PROCEDURE — 97530 THERAPEUTIC ACTIVITIES: CPT

## 2021-10-26 PROCEDURE — 0W9G3ZZ DRAINAGE OF PERITONEAL CAVITY, PERCUTANEOUS APPROACH: ICD-10-PCS | Performed by: RADIOLOGY

## 2021-10-26 PROCEDURE — 89051 BODY FLUID CELL COUNT: CPT

## 2021-10-26 PROCEDURE — 76700 US EXAM ABDOM COMPLETE: CPT

## 2021-10-26 PROCEDURE — 71045 X-RAY EXAM CHEST 1 VIEW: CPT

## 2021-10-26 PROCEDURE — 87426 SARSCOV CORONAVIRUS AG IA: CPT

## 2021-10-26 PROCEDURE — 86901 BLOOD TYPING SEROLOGIC RH(D): CPT

## 2021-10-26 PROCEDURE — 83690 ASSAY OF LIPASE: CPT

## 2021-10-26 PROCEDURE — 36569 INSJ PICC 5 YR+ W/O IMAGING: CPT

## 2021-10-26 PROCEDURE — 87205 SMEAR GRAM STAIN: CPT

## 2021-10-26 PROCEDURE — 51702 INSERT TEMP BLADDER CATH: CPT

## 2021-10-26 PROCEDURE — 80053 COMPREHEN METABOLIC PANEL: CPT

## 2021-10-26 PROCEDURE — 84484 ASSAY OF TROPONIN QUANT: CPT

## 2021-10-26 PROCEDURE — 86920 COMPATIBILITY TEST SPIN: CPT

## 2021-10-26 PROCEDURE — 49083 ABD PARACENTESIS W/IMAGING: CPT

## 2021-10-26 PROCEDURE — 84156 ASSAY OF PROTEIN URINE: CPT

## 2021-10-26 PROCEDURE — 85025 COMPLETE CBC W/AUTO DIFF WBC: CPT

## 2021-10-26 PROCEDURE — 82140 ASSAY OF AMMONIA: CPT

## 2021-10-26 PROCEDURE — 85610 PROTHROMBIN TIME: CPT

## 2021-10-26 PROCEDURE — 97116 GAIT TRAINING THERAPY: CPT

## 2021-10-26 PROCEDURE — 36415 COLL VENOUS BLD VENIPUNCTURE: CPT

## 2021-10-26 PROCEDURE — 85730 THROMBOPLASTIN TIME PARTIAL: CPT

## 2021-10-26 PROCEDURE — 86850 RBC ANTIBODY SCREEN: CPT

## 2021-10-26 PROCEDURE — 87040 BLOOD CULTURE FOR BACTERIA: CPT

## 2021-10-26 PROCEDURE — 84300 ASSAY OF URINE SODIUM: CPT

## 2021-10-26 PROCEDURE — 97163 PT EVAL HIGH COMPLEX 45 MIN: CPT

## 2021-10-26 PROCEDURE — 99291 CRITICAL CARE FIRST HOUR: CPT

## 2021-10-26 PROCEDURE — 82570 ASSAY OF URINE CREATININE: CPT

## 2021-10-26 PROCEDURE — 96361 HYDRATE IV INFUSION ADD-ON: CPT

## 2021-10-26 PROCEDURE — 87340 HEPATITIS B SURFACE AG IA: CPT

## 2021-10-26 PROCEDURE — 97110 THERAPEUTIC EXERCISES: CPT

## 2021-10-26 PROCEDURE — 76942 ECHO GUIDE FOR BIOPSY: CPT

## 2021-10-26 PROCEDURE — 96365 THER/PROPH/DIAG IV INF INIT: CPT

## 2021-10-26 PROCEDURE — 86900 BLOOD TYPING SEROLOGIC ABO: CPT

## 2021-10-26 PROCEDURE — 76775 US EXAM ABDO BACK WALL LIM: CPT

## 2021-10-26 PROCEDURE — 93005 ELECTROCARDIOGRAM TRACING: CPT

## 2021-10-26 PROCEDURE — 83735 ASSAY OF MAGNESIUM: CPT

## 2021-10-26 PROCEDURE — 96375 TX/PRO/DX INJ NEW DRUG ADDON: CPT

## 2021-10-26 PROCEDURE — 82962 GLUCOSE BLOOD TEST: CPT

## 2021-10-26 PROCEDURE — 80048 BASIC METABOLIC PNL TOTAL CA: CPT

## 2021-10-26 PROCEDURE — 81001 URINALYSIS AUTO W/SCOPE: CPT

## 2021-10-26 PROCEDURE — 74176 CT ABD & PELVIS W/O CONTRAST: CPT

## 2021-10-26 PROCEDURE — 80076 HEPATIC FUNCTION PANEL: CPT

## 2021-10-26 PROCEDURE — 83880 ASSAY OF NATRIURETIC PEPTIDE: CPT

## 2021-10-26 PROCEDURE — 83605 ASSAY OF LACTIC ACID: CPT

## 2021-10-26 PROCEDURE — 80307 DRUG TEST PRSMV CHEM ANLYZR: CPT

## 2021-10-26 RX ADMIN — SODIUM CHLORIDE SCH MCG: 9 INJECTION, SOLUTION INTRAVENOUS at 13:55

## 2021-10-26 RX ADMIN — ALBUMIN (HUMAN) SCH MLS/HR: 0.25 INJECTION, SOLUTION INTRAVENOUS at 07:00

## 2021-10-26 RX ADMIN — DEXTROSE SCH MLS/HR: 5 SOLUTION INTRAVENOUS at 11:08

## 2021-10-26 RX ADMIN — NOREPINEPHRINE BITARTRATE SCH MLS/HR: 1 INJECTION, SOLUTION, CONCENTRATE INTRAVENOUS at 14:41

## 2021-10-26 RX ADMIN — NOREPINEPHRINE BITARTRATE SCH MLS/HR: 1 INJECTION, SOLUTION, CONCENTRATE INTRAVENOUS at 23:29

## 2021-10-26 RX ADMIN — MIDODRINE HYDROCHLORIDE SCH MG: 10 TABLET ORAL at 18:00

## 2021-10-26 RX ADMIN — DEXTROSE SCH MLS/HR: 5 SOLUTION INTRAVENOUS at 03:10

## 2021-10-26 RX ADMIN — SODIUM CHLORIDE SCH MCG: 9 INJECTION, SOLUTION INTRAVENOUS at 21:42

## 2021-10-26 RX ADMIN — PHYTONADIONE SCH MG: 10 INJECTION, EMULSION INTRAMUSCULAR; INTRAVENOUS; SUBCUTANEOUS at 10:00

## 2021-10-26 RX ADMIN — Medication SCH ML: at 21:41

## 2021-10-26 RX ADMIN — NOREPINEPHRINE BITARTRATE SCH MLS/HR: 1 INJECTION, SOLUTION, CONCENTRATE INTRAVENOUS at 07:11

## 2021-10-26 RX ADMIN — ALBUMIN (HUMAN) SCH MLS/HR: 0.25 INJECTION, SOLUTION INTRAVENOUS at 15:09

## 2021-10-26 RX ADMIN — Medication SCH MG: at 10:41

## 2021-10-26 RX ADMIN — Medication SCH ML: at 14:44

## 2021-10-26 RX ADMIN — FOLIC ACID SCH MG: 1 TABLET ORAL at 10:41

## 2021-10-26 RX ADMIN — MIDODRINE HYDROCHLORIDE SCH MG: 10 TABLET ORAL at 13:10

## 2021-10-26 RX ADMIN — ALBUMIN (HUMAN) SCH MLS/HR: 0.25 INJECTION, SOLUTION INTRAVENOUS at 23:31

## 2021-10-27 VITALS — SYSTOLIC BLOOD PRESSURE: 103 MMHG | DIASTOLIC BLOOD PRESSURE: 58 MMHG

## 2021-10-27 VITALS — DIASTOLIC BLOOD PRESSURE: 66 MMHG | SYSTOLIC BLOOD PRESSURE: 108 MMHG

## 2021-10-27 LAB
ANION GAP SERPL CALCULATED.3IONS-SCNC: 16 MMOL/L (ref 5–15)
BUN SERPL-MCNC: 105 MG/DL (ref 7–18)
BUN/CREAT SERPL: 22.6
CALCIUM SERPL-MCNC: 8.3 MG/DL (ref 8.5–10.1)
CHLORIDE SERPL-SCNC: 96 MMOL/L (ref 98–107)
CO2 SERPL-SCNC: 15 MMOL/L (ref 21–32)
GLUCOSE SERPL-MCNC: 142 MG/DL (ref 74–106)
HCT VFR BLD AUTO: 22.6 % (ref 41–53)
HGB BLD-MCNC: 7.8 G/DL (ref 13.5–17.5)
MCH RBC QN AUTO: 35.5 PG (ref 28–32)
MCV RBC AUTO: 103.2 FL (ref 80–100)
NRBC BLD QL AUTO: 0.1 %
POTASSIUM SERPL-SCNC: 4 MMOL/L (ref 3.5–5.1)
SODIUM SERPL-SCNC: 127 MMOL/L (ref 136–145)

## 2021-10-27 PROCEDURE — 30233N1 TRANSFUSION OF NONAUTOLOGOUS RED BLOOD CELLS INTO PERIPHERAL VEIN, PERCUTANEOUS APPROACH: ICD-10-PCS | Performed by: HOSPITALIST

## 2021-10-27 RX ADMIN — SODIUM CHLORIDE SCH MCG: 9 INJECTION, SOLUTION INTRAVENOUS at 22:20

## 2021-10-27 RX ADMIN — CEFTRIAXONE SODIUM SCH MLS/HR: 1 INJECTION, POWDER, FOR SOLUTION INTRAMUSCULAR; INTRAVENOUS at 05:25

## 2021-10-27 RX ADMIN — SODIUM CHLORIDE SCH MCG: 9 INJECTION, SOLUTION INTRAVENOUS at 14:15

## 2021-10-27 RX ADMIN — NOREPINEPHRINE BITARTRATE SCH MLS/HR: 1 INJECTION, SOLUTION, CONCENTRATE INTRAVENOUS at 07:00

## 2021-10-27 RX ADMIN — Medication SCH ML: at 22:20

## 2021-10-27 RX ADMIN — MORPHINE SULFATE PRN MG: 2 INJECTION, SOLUTION INTRAMUSCULAR; INTRAVENOUS at 16:20

## 2021-10-27 RX ADMIN — ALBUMIN (HUMAN) SCH MLS/HR: 0.25 INJECTION, SOLUTION INTRAVENOUS at 06:20

## 2021-10-27 RX ADMIN — MIDODRINE HYDROCHLORIDE SCH MG: 10 TABLET ORAL at 12:16

## 2021-10-27 RX ADMIN — TAMSULOSIN HYDROCHLORIDE SCH MG: 0.4 CAPSULE ORAL at 18:11

## 2021-10-27 RX ADMIN — MORPHINE SULFATE PRN MG: 2 INJECTION, SOLUTION INTRAMUSCULAR; INTRAVENOUS at 23:00

## 2021-10-27 RX ADMIN — MIDODRINE HYDROCHLORIDE SCH MG: 10 TABLET ORAL at 18:11

## 2021-10-27 RX ADMIN — Medication SCH ML: at 05:29

## 2021-10-27 RX ADMIN — FOLIC ACID SCH MG: 1 TABLET ORAL at 11:12

## 2021-10-27 RX ADMIN — MIDODRINE HYDROCHLORIDE SCH MG: 10 TABLET ORAL at 05:25

## 2021-10-27 RX ADMIN — ALBUMIN (HUMAN) SCH MLS/HR: 0.25 INJECTION, SOLUTION INTRAVENOUS at 15:38

## 2021-10-27 RX ADMIN — DEXTROSE SCH MLS/HR: 5 SOLUTION INTRAVENOUS at 17:09

## 2021-10-27 RX ADMIN — PANTOPRAZOLE SODIUM SCH MG: 40 TABLET, DELAYED RELEASE ORAL at 11:12

## 2021-10-27 RX ADMIN — TAMSULOSIN HYDROCHLORIDE SCH MG: 0.4 CAPSULE ORAL at 01:55

## 2021-10-27 RX ADMIN — DEXTROSE SCH MLS/HR: 5 SOLUTION INTRAVENOUS at 03:10

## 2021-10-27 RX ADMIN — Medication SCH MG: at 11:12

## 2021-10-27 RX ADMIN — ALBUMIN (HUMAN) SCH MLS/HR: 0.25 INJECTION, SOLUTION INTRAVENOUS at 23:00

## 2021-10-27 RX ADMIN — Medication SCH ML: at 14:15

## 2021-10-27 RX ADMIN — SODIUM CHLORIDE SCH MCG: 9 INJECTION, SOLUTION INTRAVENOUS at 05:26

## 2021-10-27 RX ADMIN — PHYTONADIONE SCH MG: 10 INJECTION, EMULSION INTRAMUSCULAR; INTRAVENOUS; SUBCUTANEOUS at 11:12

## 2021-10-28 VITALS — SYSTOLIC BLOOD PRESSURE: 96 MMHG | DIASTOLIC BLOOD PRESSURE: 56 MMHG

## 2021-10-28 VITALS — SYSTOLIC BLOOD PRESSURE: 108 MMHG | DIASTOLIC BLOOD PRESSURE: 64 MMHG

## 2021-10-28 VITALS — SYSTOLIC BLOOD PRESSURE: 108 MMHG | DIASTOLIC BLOOD PRESSURE: 48 MMHG

## 2021-10-28 LAB
ALBUMIN SERPL-MCNC: 2.8 G/DL (ref 3.4–5)
ALP SERPL-CCNC: 59 U/L (ref 45–117)
ALT SERPL-CCNC: 20 U/L (ref 16–61)
ANION GAP SERPL CALCULATED.3IONS-SCNC: 11 MMOL/L (ref 5–15)
BILIRUB DIRECT SERPL-MCNC: 1.5 MG/DL (ref 0–0.2)
BILIRUB SERPL-MCNC: 4.1 MG/DL (ref 0.2–1)
BUN SERPL-MCNC: 93 MG/DL (ref 7–18)
BUN/CREAT SERPL: 24.7
CALCIUM SERPL-MCNC: 7.9 MG/DL (ref 8.5–10.1)
CHLORIDE SERPL-SCNC: 97 MMOL/L (ref 98–107)
CO2 SERPL-SCNC: 20 MMOL/L (ref 21–32)
GLUCOSE SERPL-MCNC: 121 MG/DL (ref 74–106)
HCT VFR BLD AUTO: 27.6 % (ref 41–53)
HGB BLD-MCNC: 9.5 G/DL (ref 13.5–17.5)
INR PPP: 2.27 (ref 0.9–1.15)
MCH RBC QN AUTO: 34.4 PG (ref 28–32)
MCV RBC AUTO: 100.6 FL (ref 80–100)
NRBC BLD QL AUTO: 0 %
POTASSIUM SERPL-SCNC: 3.5 MMOL/L (ref 3.5–5.1)
PROT SERPL-MCNC: 5.1 G/DL (ref 6.4–8.2)
SODIUM SERPL-SCNC: 128 MMOL/L (ref 136–145)

## 2021-10-28 PROCEDURE — 30233K1 TRANSFUSION OF NONAUTOLOGOUS FROZEN PLASMA INTO PERIPHERAL VEIN, PERCUTANEOUS APPROACH: ICD-10-PCS | Performed by: HOSPITALIST

## 2021-10-28 PROCEDURE — B54NZZA ULTRASONOGRAPHY OF LEFT UPPER EXTREMITY VEINS, GUIDANCE: ICD-10-PCS | Performed by: HOSPITALIST

## 2021-10-28 PROCEDURE — 02HV33Z INSERTION OF INFUSION DEVICE INTO SUPERIOR VENA CAVA, PERCUTANEOUS APPROACH: ICD-10-PCS | Performed by: INTERNAL MEDICINE

## 2021-10-28 PROCEDURE — 05HA33Z INSERTION OF INFUSION DEVICE INTO LEFT BRACHIAL VEIN, PERCUTANEOUS APPROACH: ICD-10-PCS | Performed by: HOSPITALIST

## 2021-10-28 RX ADMIN — DEXTROSE SCH MLS/HR: 5 SOLUTION INTRAVENOUS at 15:12

## 2021-10-28 RX ADMIN — Medication SCH ML: at 14:05

## 2021-10-28 RX ADMIN — POTASSIUM CHLORIDE SCH MLS/HR: 200 INJECTION, SOLUTION INTRAVENOUS at 14:05

## 2021-10-28 RX ADMIN — SODIUM CHLORIDE SCH MCG: 9 INJECTION, SOLUTION INTRAVENOUS at 06:39

## 2021-10-28 RX ADMIN — NOREPINEPHRINE BITARTRATE SCH MLS/HR: 1 INJECTION, SOLUTION, CONCENTRATE INTRAVENOUS at 04:30

## 2021-10-28 RX ADMIN — MIDODRINE HYDROCHLORIDE SCH MG: 10 TABLET ORAL at 11:26

## 2021-10-28 RX ADMIN — MIDODRINE HYDROCHLORIDE SCH MG: 10 TABLET ORAL at 17:46

## 2021-10-28 RX ADMIN — SODIUM CHLORIDE SCH MCG: 9 INJECTION, SOLUTION INTRAVENOUS at 22:04

## 2021-10-28 RX ADMIN — Medication SCH MG: at 10:00

## 2021-10-28 RX ADMIN — PHYTONADIONE SCH MG: 10 INJECTION, EMULSION INTRAMUSCULAR; INTRAVENOUS; SUBCUTANEOUS at 09:40

## 2021-10-28 RX ADMIN — SODIUM CHLORIDE SCH MCG: 9 INJECTION, SOLUTION INTRAVENOUS at 14:26

## 2021-10-28 RX ADMIN — SODIUM CHLORIDE SCH ML: 9 INJECTION INTRAMUSCULAR; INTRAVENOUS; SUBCUTANEOUS at 22:04

## 2021-10-28 RX ADMIN — DEXTROSE SCH MLS/HR: 5 SOLUTION INTRAVENOUS at 00:40

## 2021-10-28 RX ADMIN — POTASSIUM CHLORIDE SCH MLS/HR: 200 INJECTION, SOLUTION INTRAVENOUS at 16:40

## 2021-10-28 RX ADMIN — TAMSULOSIN HYDROCHLORIDE SCH MG: 0.4 CAPSULE ORAL at 17:46

## 2021-10-28 RX ADMIN — CEFTRIAXONE SODIUM SCH MLS/HR: 1 INJECTION, POWDER, FOR SOLUTION INTRAMUSCULAR; INTRAVENOUS at 06:38

## 2021-10-28 RX ADMIN — Medication SCH ML: at 06:39

## 2021-10-28 RX ADMIN — MORPHINE SULFATE PRN MG: 2 INJECTION, SOLUTION INTRAMUSCULAR; INTRAVENOUS at 18:45

## 2021-10-28 RX ADMIN — PANTOPRAZOLE SODIUM SCH MG: 40 TABLET, DELAYED RELEASE ORAL at 09:38

## 2021-10-28 RX ADMIN — MORPHINE SULFATE PRN MG: 2 INJECTION, SOLUTION INTRAMUSCULAR; INTRAVENOUS at 10:20

## 2021-10-28 RX ADMIN — MIDODRINE HYDROCHLORIDE SCH MG: 10 TABLET ORAL at 06:39

## 2021-10-28 RX ADMIN — Medication SCH ML: at 22:04

## 2021-10-28 RX ADMIN — FOLIC ACID SCH MG: 1 TABLET ORAL at 09:39

## 2021-10-29 LAB
ALBUMIN SERPL-MCNC: 2.5 G/DL (ref 3.4–5)
ALP SERPL-CCNC: 60 U/L (ref 45–117)
ALT SERPL-CCNC: 17 U/L (ref 16–61)
ANION GAP SERPL CALCULATED.3IONS-SCNC: 10 MMOL/L (ref 5–15)
ANION GAP SERPL CALCULATED.3IONS-SCNC: 18 MMOL/L (ref 5–15)
APTT PPP: 100.2 SEC (ref 23.6–33)
APTT PPP: 62.4 SEC (ref 23.6–33)
BILIRUB SERPL-MCNC: 5 MG/DL (ref 0.2–1)
BUN SERPL-MCNC: 77 MG/DL (ref 7–18)
BUN SERPL-MCNC: 86 MG/DL (ref 7–18)
BUN/CREAT SERPL: 27.9
BUN/CREAT SERPL: 28.5
CALCIUM SERPL-MCNC: 6.2 MG/DL (ref 8.5–10.1)
CALCIUM SERPL-MCNC: 7.8 MG/DL (ref 8.5–10.1)
CHLORIDE SERPL-SCNC: 85 MMOL/L (ref 98–107)
CHLORIDE SERPL-SCNC: 95 MMOL/L (ref 98–107)
CO2 SERPL-SCNC: 18 MMOL/L (ref 21–32)
CO2 SERPL-SCNC: 21 MMOL/L (ref 21–32)
GLUCOSE SERPL-MCNC: 104 MG/DL (ref 74–106)
GLUCOSE SERPL-MCNC: 615 MG/DL (ref 74–106)
HCT VFR BLD AUTO: 22.4 % (ref 41–53)
HCT VFR BLD AUTO: 28.8 % (ref 41–53)
HGB BLD-MCNC: 7.8 G/DL (ref 13.5–17.5)
HGB BLD-MCNC: 9.9 G/DL (ref 13.5–17.5)
INR PPP: 1.83 (ref 0.9–1.15)
INR PPP: 2.24 (ref 0.9–1.15)
MCH RBC QN AUTO: 34.1 PG (ref 28–32)
MCH RBC QN AUTO: 35.2 PG (ref 28–32)
MCV RBC AUTO: 101.6 FL (ref 80–100)
MCV RBC AUTO: 99.6 FL (ref 80–100)
NRBC BLD QL AUTO: 0 %
NRBC BLD QL AUTO: 0.1 %
POTASSIUM SERPL-SCNC: 3.1 MMOL/L (ref 3.5–5.1)
POTASSIUM SERPL-SCNC: 3.8 MMOL/L (ref 3.5–5.1)
PROT SERPL-MCNC: 4.7 G/DL (ref 6.4–8.2)
SODIUM SERPL-SCNC: 121 MMOL/L (ref 136–145)
SODIUM SERPL-SCNC: 126 MMOL/L (ref 136–145)

## 2021-10-29 PROCEDURE — 0W9G3ZZ DRAINAGE OF PERITONEAL CAVITY, PERCUTANEOUS APPROACH: ICD-10-PCS | Performed by: RADIOLOGY

## 2021-10-29 RX ADMIN — MIDODRINE HYDROCHLORIDE SCH MG: 10 TABLET ORAL at 18:59

## 2021-10-29 RX ADMIN — MIDODRINE HYDROCHLORIDE SCH MG: 10 TABLET ORAL at 06:00

## 2021-10-29 RX ADMIN — MORPHINE SULFATE PRN MG: 2 INJECTION, SOLUTION INTRAMUSCULAR; INTRAVENOUS at 13:49

## 2021-10-29 RX ADMIN — ALBUMIN (HUMAN) SCH MLS/HR: 0.25 INJECTION, SOLUTION INTRAVENOUS at 11:22

## 2021-10-29 RX ADMIN — CEFTRIAXONE SODIUM SCH MLS/HR: 1 INJECTION, POWDER, FOR SOLUTION INTRAMUSCULAR; INTRAVENOUS at 06:00

## 2021-10-29 RX ADMIN — DEXTROSE SCH MLS/HR: 5 SOLUTION INTRAVENOUS at 01:30

## 2021-10-29 RX ADMIN — PANTOPRAZOLE SODIUM SCH MG: 40 TABLET, DELAYED RELEASE ORAL at 11:24

## 2021-10-29 RX ADMIN — FUROSEMIDE SCH MG: 10 INJECTION, SOLUTION INTRAMUSCULAR; INTRAVENOUS at 18:59

## 2021-10-29 RX ADMIN — Medication SCH MG: at 12:59

## 2021-10-29 RX ADMIN — Medication SCH ML: at 21:41

## 2021-10-29 RX ADMIN — FUROSEMIDE SCH MG: 10 INJECTION, SOLUTION INTRAMUSCULAR; INTRAVENOUS at 11:24

## 2021-10-29 RX ADMIN — MIDODRINE HYDROCHLORIDE SCH MG: 10 TABLET ORAL at 12:59

## 2021-10-29 RX ADMIN — Medication SCH ML: at 14:00

## 2021-10-29 RX ADMIN — SODIUM CHLORIDE SCH MCG: 9 INJECTION, SOLUTION INTRAVENOUS at 21:42

## 2021-10-29 RX ADMIN — ALBUMIN (HUMAN) SCH MLS/HR: 0.25 INJECTION, SOLUTION INTRAVENOUS at 18:01

## 2021-10-29 RX ADMIN — TAMSULOSIN HYDROCHLORIDE SCH MG: 0.4 CAPSULE ORAL at 18:58

## 2021-10-29 RX ADMIN — Medication SCH MG: at 21:41

## 2021-10-29 RX ADMIN — SODIUM CHLORIDE SCH MCG: 9 INJECTION, SOLUTION INTRAVENOUS at 06:00

## 2021-10-29 RX ADMIN — SODIUM CHLORIDE SCH ML: 9 INJECTION INTRAMUSCULAR; INTRAVENOUS; SUBCUTANEOUS at 07:52

## 2021-10-29 RX ADMIN — NOREPINEPHRINE BITARTRATE SCH MLS/HR: 1 INJECTION, SOLUTION, CONCENTRATE INTRAVENOUS at 05:00

## 2021-10-29 RX ADMIN — ONDANSETRON HYDROCHLORIDE PRN MG: 2 INJECTION, SOLUTION INTRAMUSCULAR; INTRAVENOUS at 13:47

## 2021-10-29 RX ADMIN — Medication SCH MG: at 18:58

## 2021-10-29 RX ADMIN — SODIUM CHLORIDE SCH MCG: 9 INJECTION, SOLUTION INTRAVENOUS at 14:32

## 2021-10-29 RX ADMIN — SODIUM CHLORIDE SCH ML: 9 INJECTION INTRAMUSCULAR; INTRAVENOUS; SUBCUTANEOUS at 21:42

## 2021-10-29 RX ADMIN — Medication SCH ML: at 06:00

## 2021-10-30 LAB
ANION GAP SERPL CALCULATED.3IONS-SCNC: 9 MMOL/L (ref 5–15)
BUN SERPL-MCNC: 83 MG/DL (ref 7–18)
BUN/CREAT SERPL: 27.6
CALCIUM SERPL-MCNC: 7.9 MG/DL (ref 8.5–10.1)
CHLORIDE SERPL-SCNC: 100 MMOL/L (ref 98–107)
CO2 SERPL-SCNC: 21 MMOL/L (ref 21–32)
GLUCOSE SERPL-MCNC: 131 MG/DL (ref 74–106)
HCT VFR BLD AUTO: 24.6 % (ref 41–53)
HGB BLD-MCNC: 8.8 G/DL (ref 13.5–17.5)
MCH RBC QN AUTO: 35.5 PG (ref 28–32)
MCV RBC AUTO: 99.7 FL (ref 80–100)
NRBC BLD QL AUTO: 0 %
POTASSIUM SERPL-SCNC: 3.5 MMOL/L (ref 3.5–5.1)
SODIUM SERPL-SCNC: 130 MMOL/L (ref 136–145)

## 2021-10-30 RX ADMIN — Medication SCH MG: at 12:13

## 2021-10-30 RX ADMIN — CEFTRIAXONE SODIUM SCH MLS/HR: 1 INJECTION, POWDER, FOR SOLUTION INTRAMUSCULAR; INTRAVENOUS at 06:13

## 2021-10-30 RX ADMIN — Medication SCH ML: at 18:01

## 2021-10-30 RX ADMIN — MIDODRINE HYDROCHLORIDE SCH MG: 10 TABLET ORAL at 06:13

## 2021-10-30 RX ADMIN — Medication SCH MG: at 06:13

## 2021-10-30 RX ADMIN — SODIUM CHLORIDE SCH ML: 9 INJECTION INTRAMUSCULAR; INTRAVENOUS; SUBCUTANEOUS at 18:11

## 2021-10-30 RX ADMIN — SODIUM CHLORIDE SCH MCG: 9 INJECTION, SOLUTION INTRAVENOUS at 14:46

## 2021-10-30 RX ADMIN — SODIUM CHLORIDE SCH MCG: 9 INJECTION, SOLUTION INTRAVENOUS at 06:14

## 2021-10-30 RX ADMIN — MIDODRINE HYDROCHLORIDE SCH MG: 10 TABLET ORAL at 18:11

## 2021-10-30 RX ADMIN — ALBUMIN (HUMAN) SCH MLS/HR: 0.25 INJECTION, SOLUTION INTRAVENOUS at 09:30

## 2021-10-30 RX ADMIN — Medication SCH ML: at 22:19

## 2021-10-30 RX ADMIN — Medication SCH ML: at 06:13

## 2021-10-30 RX ADMIN — NOREPINEPHRINE BITARTRATE SCH MLS/HR: 1 INJECTION, SOLUTION, CONCENTRATE INTRAVENOUS at 03:58

## 2021-10-30 RX ADMIN — ALBUMIN (HUMAN) SCH MLS/HR: 0.25 INJECTION, SOLUTION INTRAVENOUS at 01:00

## 2021-10-30 RX ADMIN — SODIUM CHLORIDE SCH ML: 9 INJECTION INTRAMUSCULAR; INTRAVENOUS; SUBCUTANEOUS at 10:06

## 2021-10-30 RX ADMIN — FUROSEMIDE SCH MG: 10 INJECTION, SOLUTION INTRAMUSCULAR; INTRAVENOUS at 18:11

## 2021-10-30 RX ADMIN — TAMSULOSIN HYDROCHLORIDE SCH MG: 0.4 CAPSULE ORAL at 18:11

## 2021-10-30 RX ADMIN — ALBUMIN (HUMAN) SCH MLS/HR: 0.25 INJECTION, SOLUTION INTRAVENOUS at 17:21

## 2021-10-30 RX ADMIN — MIDODRINE HYDROCHLORIDE SCH MG: 10 TABLET ORAL at 12:13

## 2021-10-30 RX ADMIN — FUROSEMIDE SCH MG: 10 INJECTION, SOLUTION INTRAMUSCULAR; INTRAVENOUS at 06:13

## 2021-10-30 RX ADMIN — SODIUM CHLORIDE SCH MCG: 9 INJECTION, SOLUTION INTRAVENOUS at 22:19

## 2021-10-30 RX ADMIN — Medication SCH MG: at 18:11

## 2021-10-30 RX ADMIN — Medication SCH MG: at 22:19

## 2021-10-30 RX ADMIN — PANTOPRAZOLE SODIUM SCH MG: 40 TABLET, DELAYED RELEASE ORAL at 10:08

## 2021-10-31 VITALS — SYSTOLIC BLOOD PRESSURE: 103 MMHG | DIASTOLIC BLOOD PRESSURE: 61 MMHG

## 2021-10-31 VITALS — DIASTOLIC BLOOD PRESSURE: 56 MMHG | SYSTOLIC BLOOD PRESSURE: 92 MMHG

## 2021-10-31 VITALS — SYSTOLIC BLOOD PRESSURE: 99 MMHG | DIASTOLIC BLOOD PRESSURE: 64 MMHG

## 2021-10-31 VITALS — SYSTOLIC BLOOD PRESSURE: 100 MMHG | DIASTOLIC BLOOD PRESSURE: 61 MMHG

## 2021-10-31 VITALS — SYSTOLIC BLOOD PRESSURE: 96 MMHG | DIASTOLIC BLOOD PRESSURE: 61 MMHG

## 2021-10-31 VITALS — SYSTOLIC BLOOD PRESSURE: 100 MMHG | DIASTOLIC BLOOD PRESSURE: 66 MMHG

## 2021-10-31 VITALS — SYSTOLIC BLOOD PRESSURE: 98 MMHG | DIASTOLIC BLOOD PRESSURE: 64 MMHG

## 2021-10-31 VITALS — SYSTOLIC BLOOD PRESSURE: 109 MMHG | DIASTOLIC BLOOD PRESSURE: 68 MMHG

## 2021-10-31 VITALS — SYSTOLIC BLOOD PRESSURE: 87 MMHG | DIASTOLIC BLOOD PRESSURE: 57 MMHG

## 2021-10-31 VITALS — SYSTOLIC BLOOD PRESSURE: 103 MMHG | DIASTOLIC BLOOD PRESSURE: 66 MMHG

## 2021-10-31 VITALS — SYSTOLIC BLOOD PRESSURE: 90 MMHG | DIASTOLIC BLOOD PRESSURE: 51 MMHG

## 2021-10-31 VITALS — SYSTOLIC BLOOD PRESSURE: 108 MMHG | DIASTOLIC BLOOD PRESSURE: 65 MMHG

## 2021-10-31 VITALS — DIASTOLIC BLOOD PRESSURE: 51 MMHG | SYSTOLIC BLOOD PRESSURE: 90 MMHG

## 2021-10-31 VITALS — SYSTOLIC BLOOD PRESSURE: 107 MMHG | DIASTOLIC BLOOD PRESSURE: 61 MMHG

## 2021-10-31 VITALS — DIASTOLIC BLOOD PRESSURE: 51 MMHG | SYSTOLIC BLOOD PRESSURE: 92 MMHG

## 2021-10-31 VITALS — DIASTOLIC BLOOD PRESSURE: 59 MMHG | SYSTOLIC BLOOD PRESSURE: 106 MMHG

## 2021-10-31 VITALS — DIASTOLIC BLOOD PRESSURE: 68 MMHG | SYSTOLIC BLOOD PRESSURE: 112 MMHG

## 2021-10-31 VITALS — DIASTOLIC BLOOD PRESSURE: 62 MMHG | SYSTOLIC BLOOD PRESSURE: 101 MMHG

## 2021-10-31 VITALS — SYSTOLIC BLOOD PRESSURE: 101 MMHG | DIASTOLIC BLOOD PRESSURE: 57 MMHG

## 2021-10-31 VITALS — DIASTOLIC BLOOD PRESSURE: 65 MMHG | SYSTOLIC BLOOD PRESSURE: 105 MMHG

## 2021-10-31 VITALS — DIASTOLIC BLOOD PRESSURE: 67 MMHG | SYSTOLIC BLOOD PRESSURE: 107 MMHG

## 2021-10-31 VITALS — SYSTOLIC BLOOD PRESSURE: 102 MMHG | DIASTOLIC BLOOD PRESSURE: 66 MMHG

## 2021-10-31 VITALS — SYSTOLIC BLOOD PRESSURE: 89 MMHG | DIASTOLIC BLOOD PRESSURE: 65 MMHG

## 2021-10-31 VITALS — SYSTOLIC BLOOD PRESSURE: 84 MMHG | DIASTOLIC BLOOD PRESSURE: 54 MMHG

## 2021-10-31 VITALS — SYSTOLIC BLOOD PRESSURE: 106 MMHG | DIASTOLIC BLOOD PRESSURE: 65 MMHG

## 2021-10-31 VITALS — SYSTOLIC BLOOD PRESSURE: 102 MMHG | DIASTOLIC BLOOD PRESSURE: 56 MMHG

## 2021-10-31 VITALS — SYSTOLIC BLOOD PRESSURE: 103 MMHG | DIASTOLIC BLOOD PRESSURE: 69 MMHG

## 2021-10-31 VITALS — SYSTOLIC BLOOD PRESSURE: 97 MMHG | DIASTOLIC BLOOD PRESSURE: 59 MMHG

## 2021-10-31 VITALS — SYSTOLIC BLOOD PRESSURE: 94 MMHG | DIASTOLIC BLOOD PRESSURE: 59 MMHG

## 2021-10-31 VITALS — SYSTOLIC BLOOD PRESSURE: 100 MMHG | DIASTOLIC BLOOD PRESSURE: 60 MMHG

## 2021-10-31 VITALS — DIASTOLIC BLOOD PRESSURE: 68 MMHG | SYSTOLIC BLOOD PRESSURE: 109 MMHG

## 2021-10-31 VITALS — DIASTOLIC BLOOD PRESSURE: 65 MMHG | SYSTOLIC BLOOD PRESSURE: 109 MMHG

## 2021-10-31 VITALS — DIASTOLIC BLOOD PRESSURE: 57 MMHG | SYSTOLIC BLOOD PRESSURE: 98 MMHG

## 2021-10-31 VITALS — DIASTOLIC BLOOD PRESSURE: 63 MMHG | SYSTOLIC BLOOD PRESSURE: 96 MMHG

## 2021-10-31 VITALS — DIASTOLIC BLOOD PRESSURE: 56 MMHG | SYSTOLIC BLOOD PRESSURE: 93 MMHG

## 2021-10-31 VITALS — SYSTOLIC BLOOD PRESSURE: 104 MMHG | DIASTOLIC BLOOD PRESSURE: 68 MMHG

## 2021-10-31 VITALS — DIASTOLIC BLOOD PRESSURE: 59 MMHG | SYSTOLIC BLOOD PRESSURE: 100 MMHG

## 2021-10-31 VITALS — DIASTOLIC BLOOD PRESSURE: 60 MMHG | SYSTOLIC BLOOD PRESSURE: 90 MMHG

## 2021-10-31 VITALS — DIASTOLIC BLOOD PRESSURE: 49 MMHG | SYSTOLIC BLOOD PRESSURE: 83 MMHG

## 2021-10-31 LAB
ANION GAP SERPL CALCULATED.3IONS-SCNC: 11 MMOL/L (ref 5–15)
BUN SERPL-MCNC: 77 MG/DL (ref 7–18)
BUN/CREAT SERPL: 27.2
CALCIUM SERPL-MCNC: 8.1 MG/DL (ref 8.5–10.1)
CHLORIDE SERPL-SCNC: 101 MMOL/L (ref 98–107)
CO2 SERPL-SCNC: 22 MMOL/L (ref 21–32)
GLUCOSE SERPL-MCNC: 158 MG/DL (ref 74–106)
HCT VFR BLD AUTO: 25.9 % (ref 41–53)
HGB BLD-MCNC: 9.1 G/DL (ref 13.5–17.5)
MCH RBC QN AUTO: 35.4 PG (ref 28–32)
MCV RBC AUTO: 100.7 FL (ref 80–100)
NRBC BLD QL AUTO: 0.1 %
POTASSIUM SERPL-SCNC: 2.9 MMOL/L (ref 3.5–5.1)
SODIUM SERPL-SCNC: 134 MMOL/L (ref 136–145)

## 2021-10-31 RX ADMIN — Medication SCH ML: at 22:28

## 2021-10-31 RX ADMIN — NOREPINEPHRINE BITARTRATE SCH MLS/HR: 1 INJECTION, SOLUTION, CONCENTRATE INTRAVENOUS at 02:00

## 2021-10-31 RX ADMIN — SODIUM CHLORIDE SCH ML: 9 INJECTION INTRAMUSCULAR; INTRAVENOUS; SUBCUTANEOUS at 09:26

## 2021-10-31 RX ADMIN — MIDODRINE HYDROCHLORIDE SCH MG: 10 TABLET ORAL at 17:43

## 2021-10-31 RX ADMIN — MORPHINE SULFATE PRN MG: 2 INJECTION, SOLUTION INTRAMUSCULAR; INTRAVENOUS at 17:30

## 2021-10-31 RX ADMIN — POTASSIUM CHLORIDE SCH MLS/HR: 200 INJECTION, SOLUTION INTRAVENOUS at 16:31

## 2021-10-31 RX ADMIN — SODIUM CHLORIDE SCH ML: 9 INJECTION INTRAMUSCULAR; INTRAVENOUS; SUBCUTANEOUS at 22:15

## 2021-10-31 RX ADMIN — ALBUMIN (HUMAN) SCH MLS/HR: 0.25 INJECTION, SOLUTION INTRAVENOUS at 01:20

## 2021-10-31 RX ADMIN — TAMSULOSIN HYDROCHLORIDE SCH MG: 0.4 CAPSULE ORAL at 17:42

## 2021-10-31 RX ADMIN — SODIUM CHLORIDE SCH MCG: 9 INJECTION, SOLUTION INTRAVENOUS at 06:45

## 2021-10-31 RX ADMIN — SODIUM CHLORIDE SCH MCG: 9 INJECTION, SOLUTION INTRAVENOUS at 13:40

## 2021-10-31 RX ADMIN — POTASSIUM CHLORIDE SCH MLS/HR: 200 INJECTION, SOLUTION INTRAVENOUS at 14:42

## 2021-10-31 RX ADMIN — Medication SCH ML: at 13:40

## 2021-10-31 RX ADMIN — Medication SCH ML: at 06:45

## 2021-10-31 RX ADMIN — CEFTRIAXONE SODIUM SCH MLS/HR: 1 INJECTION, POWDER, FOR SOLUTION INTRAMUSCULAR; INTRAVENOUS at 06:45

## 2021-10-31 RX ADMIN — SPIRONOLACTONE SCH MG: 25 TABLET, FILM COATED ORAL at 12:18

## 2021-10-31 RX ADMIN — ALBUMIN (HUMAN) SCH MLS/HR: 0.25 INJECTION, SOLUTION INTRAVENOUS at 18:20

## 2021-10-31 RX ADMIN — FUROSEMIDE SCH MG: 10 INJECTION, SOLUTION INTRAMUSCULAR; INTRAVENOUS at 06:00

## 2021-10-31 RX ADMIN — MORPHINE SULFATE PRN MG: 2 INJECTION, SOLUTION INTRAMUSCULAR; INTRAVENOUS at 08:28

## 2021-10-31 RX ADMIN — POTASSIUM CHLORIDE SCH MLS/HR: 200 INJECTION, SOLUTION INTRAVENOUS at 11:05

## 2021-10-31 RX ADMIN — MIDODRINE HYDROCHLORIDE SCH MG: 10 TABLET ORAL at 06:45

## 2021-10-31 RX ADMIN — MIDODRINE HYDROCHLORIDE SCH MG: 10 TABLET ORAL at 11:17

## 2021-10-31 RX ADMIN — SODIUM CHLORIDE SCH MCG: 9 INJECTION, SOLUTION INTRAVENOUS at 22:28

## 2021-10-31 RX ADMIN — POTASSIUM CHLORIDE SCH MLS/HR: 200 INJECTION, SOLUTION INTRAVENOUS at 12:36

## 2021-10-31 RX ADMIN — Medication SCH MG: at 09:26

## 2021-10-31 RX ADMIN — PANTOPRAZOLE SODIUM SCH MG: 40 TABLET, DELAYED RELEASE ORAL at 09:26

## 2021-10-31 RX ADMIN — Medication SCH MG: at 06:45

## 2021-11-01 VITALS — SYSTOLIC BLOOD PRESSURE: 124 MMHG | DIASTOLIC BLOOD PRESSURE: 55 MMHG

## 2021-11-01 VITALS — DIASTOLIC BLOOD PRESSURE: 58 MMHG | SYSTOLIC BLOOD PRESSURE: 117 MMHG

## 2021-11-01 VITALS — DIASTOLIC BLOOD PRESSURE: 75 MMHG | SYSTOLIC BLOOD PRESSURE: 113 MMHG

## 2021-11-01 VITALS — SYSTOLIC BLOOD PRESSURE: 77 MMHG | DIASTOLIC BLOOD PRESSURE: 45 MMHG

## 2021-11-01 VITALS — SYSTOLIC BLOOD PRESSURE: 124 MMHG | DIASTOLIC BLOOD PRESSURE: 75 MMHG

## 2021-11-01 VITALS — SYSTOLIC BLOOD PRESSURE: 111 MMHG | DIASTOLIC BLOOD PRESSURE: 63 MMHG

## 2021-11-01 VITALS — DIASTOLIC BLOOD PRESSURE: 57 MMHG | SYSTOLIC BLOOD PRESSURE: 112 MMHG

## 2021-11-01 VITALS — SYSTOLIC BLOOD PRESSURE: 97 MMHG | DIASTOLIC BLOOD PRESSURE: 60 MMHG

## 2021-11-01 LAB
ANION GAP SERPL CALCULATED.3IONS-SCNC: 12 MMOL/L (ref 5–15)
BUN SERPL-MCNC: 74 MG/DL (ref 7–18)
BUN/CREAT SERPL: 24.9
CALCIUM SERPL-MCNC: 8.3 MG/DL (ref 8.5–10.1)
CHLORIDE SERPL-SCNC: 101 MMOL/L (ref 98–107)
CO2 SERPL-SCNC: 21 MMOL/L (ref 21–32)
GLUCOSE SERPL-MCNC: 124 MG/DL (ref 74–106)
HCT VFR BLD AUTO: 29.9 % (ref 41–53)
HGB BLD-MCNC: 10.4 G/DL (ref 13.5–17.5)
MCH RBC QN AUTO: 35.2 PG (ref 28–32)
MCV RBC AUTO: 100.8 FL (ref 80–100)
NRBC BLD QL AUTO: 0.1 %
POTASSIUM SERPL-SCNC: 3.8 MMOL/L (ref 3.5–5.1)
SODIUM SERPL-SCNC: 134 MMOL/L (ref 136–145)

## 2021-11-01 RX ADMIN — SODIUM CHLORIDE SCH ML: 9 INJECTION INTRAMUSCULAR; INTRAVENOUS; SUBCUTANEOUS at 21:00

## 2021-11-01 RX ADMIN — MIDODRINE HYDROCHLORIDE SCH MG: 10 TABLET ORAL at 12:11

## 2021-11-01 RX ADMIN — ALBUMIN (HUMAN) SCH MLS/HR: 0.25 INJECTION, SOLUTION INTRAVENOUS at 02:16

## 2021-11-01 RX ADMIN — SODIUM CHLORIDE SCH MCG: 9 INJECTION, SOLUTION INTRAVENOUS at 21:17

## 2021-11-01 RX ADMIN — SODIUM CHLORIDE SCH MCG: 9 INJECTION, SOLUTION INTRAVENOUS at 13:31

## 2021-11-01 RX ADMIN — Medication SCH ML: at 05:59

## 2021-11-01 RX ADMIN — Medication SCH ML: at 21:16

## 2021-11-01 RX ADMIN — TAMSULOSIN HYDROCHLORIDE SCH MG: 0.4 CAPSULE ORAL at 18:14

## 2021-11-01 RX ADMIN — CEFTRIAXONE SODIUM SCH MLS/HR: 1 INJECTION, POWDER, FOR SOLUTION INTRAMUSCULAR; INTRAVENOUS at 05:59

## 2021-11-01 RX ADMIN — MIDODRINE HYDROCHLORIDE SCH MG: 10 TABLET ORAL at 05:59

## 2021-11-01 RX ADMIN — ALBUMIN (HUMAN) SCH MLS/HR: 0.25 INJECTION, SOLUTION INTRAVENOUS at 18:26

## 2021-11-01 RX ADMIN — Medication SCH ML: at 13:31

## 2021-11-01 RX ADMIN — PANTOPRAZOLE SODIUM SCH MG: 40 TABLET, DELAYED RELEASE ORAL at 09:11

## 2021-11-01 RX ADMIN — ALBUMIN (HUMAN) SCH MLS/HR: 0.25 INJECTION, SOLUTION INTRAVENOUS at 10:16

## 2021-11-01 RX ADMIN — MIDODRINE HYDROCHLORIDE SCH MG: 10 TABLET ORAL at 18:14

## 2021-11-01 RX ADMIN — SODIUM CHLORIDE SCH MCG: 9 INJECTION, SOLUTION INTRAVENOUS at 06:00

## 2021-11-01 RX ADMIN — SODIUM CHLORIDE SCH ML: 9 INJECTION INTRAMUSCULAR; INTRAVENOUS; SUBCUTANEOUS at 09:11

## 2021-11-01 RX ADMIN — NOREPINEPHRINE BITARTRATE SCH MLS/HR: 1 INJECTION, SOLUTION, CONCENTRATE INTRAVENOUS at 05:00

## 2021-11-01 RX ADMIN — SPIRONOLACTONE SCH MG: 25 TABLET, FILM COATED ORAL at 09:11

## 2021-11-02 VITALS — SYSTOLIC BLOOD PRESSURE: 114 MMHG | DIASTOLIC BLOOD PRESSURE: 63 MMHG

## 2021-11-02 VITALS — SYSTOLIC BLOOD PRESSURE: 132 MMHG | DIASTOLIC BLOOD PRESSURE: 106 MMHG

## 2021-11-02 VITALS — DIASTOLIC BLOOD PRESSURE: 67 MMHG | SYSTOLIC BLOOD PRESSURE: 121 MMHG

## 2021-11-02 VITALS — DIASTOLIC BLOOD PRESSURE: 57 MMHG | SYSTOLIC BLOOD PRESSURE: 107 MMHG

## 2021-11-02 VITALS — DIASTOLIC BLOOD PRESSURE: 66 MMHG | SYSTOLIC BLOOD PRESSURE: 109 MMHG

## 2021-11-02 VITALS — SYSTOLIC BLOOD PRESSURE: 112 MMHG | DIASTOLIC BLOOD PRESSURE: 70 MMHG

## 2021-11-02 VITALS — DIASTOLIC BLOOD PRESSURE: 66 MMHG | SYSTOLIC BLOOD PRESSURE: 108 MMHG

## 2021-11-02 VITALS — DIASTOLIC BLOOD PRESSURE: 57 MMHG | SYSTOLIC BLOOD PRESSURE: 111 MMHG

## 2021-11-02 VITALS — DIASTOLIC BLOOD PRESSURE: 64 MMHG | SYSTOLIC BLOOD PRESSURE: 104 MMHG

## 2021-11-02 VITALS — SYSTOLIC BLOOD PRESSURE: 116 MMHG | DIASTOLIC BLOOD PRESSURE: 68 MMHG

## 2021-11-02 VITALS — DIASTOLIC BLOOD PRESSURE: 60 MMHG | SYSTOLIC BLOOD PRESSURE: 113 MMHG

## 2021-11-02 VITALS — DIASTOLIC BLOOD PRESSURE: 74 MMHG | SYSTOLIC BLOOD PRESSURE: 124 MMHG

## 2021-11-02 VITALS — DIASTOLIC BLOOD PRESSURE: 74 MMHG | SYSTOLIC BLOOD PRESSURE: 123 MMHG

## 2021-11-02 LAB
ALBUMIN SERPL-MCNC: 3 G/DL (ref 3.4–5)
ALP SERPL-CCNC: 52 U/L (ref 45–117)
ALT SERPL-CCNC: 15 U/L (ref 16–61)
ANION GAP SERPL CALCULATED.3IONS-SCNC: 11 MMOL/L (ref 5–15)
APTT PPP: 68 SEC (ref 23.6–33)
BILIRUB DIRECT SERPL-MCNC: 3.5 MG/DL (ref 0–0.2)
BILIRUB SERPL-MCNC: 7.8 MG/DL (ref 0.2–1)
BUN SERPL-MCNC: 73 MG/DL (ref 7–18)
BUN/CREAT SERPL: 25.3
CALCIUM SERPL-MCNC: 8.4 MG/DL (ref 8.5–10.1)
CHLORIDE SERPL-SCNC: 102 MMOL/L (ref 98–107)
CO2 SERPL-SCNC: 22 MMOL/L (ref 21–32)
GLUCOSE SERPL-MCNC: 150 MG/DL (ref 74–106)
HCT VFR BLD AUTO: 21.4 % (ref 41–53)
HGB BLD-MCNC: 7.3 G/DL (ref 13.5–17.5)
INR PPP: 2.57 (ref 0.9–1.15)
MCH RBC QN AUTO: 35 PG (ref 28–32)
MCV RBC AUTO: 102.3 FL (ref 80–100)
NRBC BLD QL AUTO: 0.1 %
POTASSIUM SERPL-SCNC: 3.6 MMOL/L (ref 3.5–5.1)
PROT SERPL-MCNC: 4.9 G/DL (ref 6.4–8.2)
SODIUM SERPL-SCNC: 135 MMOL/L (ref 136–145)

## 2021-11-02 PROCEDURE — 0W9G3ZZ DRAINAGE OF PERITONEAL CAVITY, PERCUTANEOUS APPROACH: ICD-10-PCS | Performed by: RADIOLOGY

## 2021-11-02 RX ADMIN — SPIRONOLACTONE SCH MG: 25 TABLET, FILM COATED ORAL at 08:43

## 2021-11-02 RX ADMIN — SODIUM CHLORIDE SCH MCG: 9 INJECTION, SOLUTION INTRAVENOUS at 05:32

## 2021-11-02 RX ADMIN — Medication SCH ML: at 14:20

## 2021-11-02 RX ADMIN — MIDODRINE HYDROCHLORIDE SCH MG: 10 TABLET ORAL at 05:32

## 2021-11-02 RX ADMIN — MIDODRINE HYDROCHLORIDE SCH MG: 10 TABLET ORAL at 12:00

## 2021-11-02 RX ADMIN — ALBUMIN (HUMAN) SCH MLS/HR: 0.25 INJECTION, SOLUTION INTRAVENOUS at 02:30

## 2021-11-02 RX ADMIN — SODIUM CHLORIDE SCH ML: 9 INJECTION INTRAMUSCULAR; INTRAVENOUS; SUBCUTANEOUS at 21:46

## 2021-11-02 RX ADMIN — MIDODRINE HYDROCHLORIDE SCH MG: 10 TABLET ORAL at 18:00

## 2021-11-02 RX ADMIN — NOREPINEPHRINE BITARTRATE SCH MLS/HR: 1 INJECTION, SOLUTION, CONCENTRATE INTRAVENOUS at 22:10

## 2021-11-02 RX ADMIN — NOREPINEPHRINE BITARTRATE SCH MLS/HR: 1 INJECTION, SOLUTION, CONCENTRATE INTRAVENOUS at 14:42

## 2021-11-02 RX ADMIN — CEFTRIAXONE SODIUM SCH MLS/HR: 1 INJECTION, POWDER, FOR SOLUTION INTRAMUSCULAR; INTRAVENOUS at 05:32

## 2021-11-02 RX ADMIN — Medication SCH ML: at 05:32

## 2021-11-02 RX ADMIN — SODIUM CHLORIDE SCH MCG: 9 INJECTION, SOLUTION INTRAVENOUS at 21:47

## 2021-11-02 RX ADMIN — SODIUM CHLORIDE SCH MCG: 9 INJECTION, SOLUTION INTRAVENOUS at 14:20

## 2021-11-02 RX ADMIN — TAMSULOSIN HYDROCHLORIDE SCH MG: 0.4 CAPSULE ORAL at 18:55

## 2021-11-02 RX ADMIN — MORPHINE SULFATE PRN MG: 2 INJECTION, SOLUTION INTRAMUSCULAR; INTRAVENOUS at 22:11

## 2021-11-02 RX ADMIN — Medication SCH ML: at 21:46

## 2021-11-02 RX ADMIN — SODIUM CHLORIDE SCH ML: 9 INJECTION INTRAMUSCULAR; INTRAVENOUS; SUBCUTANEOUS at 08:42

## 2021-11-02 RX ADMIN — NOREPINEPHRINE BITARTRATE SCH MLS/HR: 1 INJECTION, SOLUTION, CONCENTRATE INTRAVENOUS at 02:00

## 2021-11-02 RX ADMIN — ALBUMIN (HUMAN) SCH MLS/HR: 0.25 INJECTION, SOLUTION INTRAVENOUS at 10:52

## 2021-11-02 RX ADMIN — PANTOPRAZOLE SODIUM SCH MG: 40 TABLET, DELAYED RELEASE ORAL at 08:43

## 2021-11-03 VITALS — SYSTOLIC BLOOD PRESSURE: 100 MMHG | DIASTOLIC BLOOD PRESSURE: 67 MMHG

## 2021-11-03 VITALS — SYSTOLIC BLOOD PRESSURE: 101 MMHG | DIASTOLIC BLOOD PRESSURE: 59 MMHG

## 2021-11-03 VITALS — SYSTOLIC BLOOD PRESSURE: 96 MMHG | DIASTOLIC BLOOD PRESSURE: 68 MMHG

## 2021-11-03 VITALS — DIASTOLIC BLOOD PRESSURE: 64 MMHG | SYSTOLIC BLOOD PRESSURE: 97 MMHG

## 2021-11-03 VITALS — DIASTOLIC BLOOD PRESSURE: 60 MMHG | SYSTOLIC BLOOD PRESSURE: 109 MMHG

## 2021-11-03 VITALS — SYSTOLIC BLOOD PRESSURE: 100 MMHG | DIASTOLIC BLOOD PRESSURE: 61 MMHG

## 2021-11-03 VITALS — DIASTOLIC BLOOD PRESSURE: 65 MMHG | SYSTOLIC BLOOD PRESSURE: 109 MMHG

## 2021-11-03 VITALS — DIASTOLIC BLOOD PRESSURE: 66 MMHG | SYSTOLIC BLOOD PRESSURE: 96 MMHG

## 2021-11-03 VITALS — DIASTOLIC BLOOD PRESSURE: 60 MMHG | SYSTOLIC BLOOD PRESSURE: 106 MMHG

## 2021-11-03 VITALS — DIASTOLIC BLOOD PRESSURE: 67 MMHG | SYSTOLIC BLOOD PRESSURE: 109 MMHG

## 2021-11-03 VITALS — DIASTOLIC BLOOD PRESSURE: 57 MMHG | SYSTOLIC BLOOD PRESSURE: 107 MMHG

## 2021-11-03 VITALS — DIASTOLIC BLOOD PRESSURE: 58 MMHG | SYSTOLIC BLOOD PRESSURE: 105 MMHG

## 2021-11-03 LAB
ALBUMIN SERPL-MCNC: 2.5 G/DL (ref 3.4–5)
ALP SERPL-CCNC: 53 U/L (ref 45–117)
ALT SERPL-CCNC: 14 U/L (ref 16–61)
ANION GAP SERPL CALCULATED.3IONS-SCNC: 7 MMOL/L (ref 5–15)
BILIRUB SERPL-MCNC: 8.2 MG/DL (ref 0.2–1)
BUN SERPL-MCNC: 55 MG/DL (ref 7–18)
BUN/CREAT SERPL: 25.1
CALCIUM SERPL-MCNC: 7.6 MG/DL (ref 8.5–10.1)
CHLORIDE SERPL-SCNC: 108 MMOL/L (ref 98–107)
CO2 SERPL-SCNC: 21 MMOL/L (ref 21–32)
GLUCOSE SERPL-MCNC: 120 MG/DL (ref 74–106)
HCT VFR BLD AUTO: 29.7 % (ref 41–53)
HGB BLD-MCNC: 10.1 G/DL (ref 13.5–17.5)
MCH RBC QN AUTO: 34.3 PG (ref 28–32)
MCV RBC AUTO: 100.2 FL (ref 80–100)
NRBC BLD QL AUTO: 0 %
POTASSIUM SERPL-SCNC: 3.5 MMOL/L (ref 3.5–5.1)
PROT SERPL-MCNC: 4.5 G/DL (ref 6.4–8.2)
SODIUM SERPL-SCNC: 136 MMOL/L (ref 136–145)

## 2021-11-03 RX ADMIN — Medication SCH ML: at 21:49

## 2021-11-03 RX ADMIN — MIDODRINE HYDROCHLORIDE SCH MG: 10 TABLET ORAL at 17:27

## 2021-11-03 RX ADMIN — NOREPINEPHRINE BITARTRATE SCH MLS/HR: 1 INJECTION, SOLUTION, CONCENTRATE INTRAVENOUS at 14:07

## 2021-11-03 RX ADMIN — MIDODRINE HYDROCHLORIDE SCH MG: 10 TABLET ORAL at 12:30

## 2021-11-03 RX ADMIN — SODIUM CHLORIDE SCH ML: 9 INJECTION INTRAMUSCULAR; INTRAVENOUS; SUBCUTANEOUS at 10:06

## 2021-11-03 RX ADMIN — Medication SCH ML: at 05:48

## 2021-11-03 RX ADMIN — SODIUM CHLORIDE SCH MCG: 9 INJECTION, SOLUTION INTRAVENOUS at 21:51

## 2021-11-03 RX ADMIN — SODIUM CHLORIDE SCH MCG: 9 INJECTION, SOLUTION INTRAVENOUS at 05:49

## 2021-11-03 RX ADMIN — TAMSULOSIN HYDROCHLORIDE SCH MG: 0.4 CAPSULE ORAL at 17:27

## 2021-11-03 RX ADMIN — NOREPINEPHRINE BITARTRATE SCH MLS/HR: 1 INJECTION, SOLUTION, CONCENTRATE INTRAVENOUS at 12:31

## 2021-11-03 RX ADMIN — PANTOPRAZOLE SODIUM SCH MG: 40 TABLET, DELAYED RELEASE ORAL at 10:07

## 2021-11-03 RX ADMIN — Medication SCH ML: at 14:05

## 2021-11-03 RX ADMIN — MIDODRINE HYDROCHLORIDE SCH MG: 10 TABLET ORAL at 05:49

## 2021-11-03 RX ADMIN — MORPHINE SULFATE PRN MG: 2 INJECTION, SOLUTION INTRAMUSCULAR; INTRAVENOUS at 20:08

## 2021-11-03 RX ADMIN — ONDANSETRON HYDROCHLORIDE PRN MG: 2 INJECTION, SOLUTION INTRAMUSCULAR; INTRAVENOUS at 20:08

## 2021-11-03 RX ADMIN — NOREPINEPHRINE BITARTRATE SCH MLS/HR: 1 INJECTION, SOLUTION, CONCENTRATE INTRAVENOUS at 06:57

## 2021-11-03 RX ADMIN — CEFTRIAXONE SODIUM SCH MLS/HR: 1 INJECTION, POWDER, FOR SOLUTION INTRAMUSCULAR; INTRAVENOUS at 05:48

## 2021-11-03 RX ADMIN — NOREPINEPHRINE BITARTRATE SCH MLS/HR: 1 INJECTION, SOLUTION, CONCENTRATE INTRAVENOUS at 12:13

## 2021-11-03 RX ADMIN — SODIUM CHLORIDE SCH ML: 9 INJECTION INTRAMUSCULAR; INTRAVENOUS; SUBCUTANEOUS at 21:49

## 2021-11-03 RX ADMIN — SPIRONOLACTONE SCH MG: 25 TABLET, FILM COATED ORAL at 10:07

## 2021-11-03 RX ADMIN — SODIUM CHLORIDE SCH MCG: 9 INJECTION, SOLUTION INTRAVENOUS at 14:05

## 2021-11-04 VITALS — DIASTOLIC BLOOD PRESSURE: 44 MMHG | SYSTOLIC BLOOD PRESSURE: 83 MMHG

## 2021-11-04 VITALS — DIASTOLIC BLOOD PRESSURE: 57 MMHG | SYSTOLIC BLOOD PRESSURE: 98 MMHG

## 2021-11-04 VITALS — DIASTOLIC BLOOD PRESSURE: 52 MMHG | SYSTOLIC BLOOD PRESSURE: 92 MMHG

## 2021-11-04 VITALS — DIASTOLIC BLOOD PRESSURE: 53 MMHG | SYSTOLIC BLOOD PRESSURE: 86 MMHG

## 2021-11-04 VITALS — SYSTOLIC BLOOD PRESSURE: 86 MMHG | DIASTOLIC BLOOD PRESSURE: 50 MMHG

## 2021-11-04 VITALS — SYSTOLIC BLOOD PRESSURE: 75 MMHG | DIASTOLIC BLOOD PRESSURE: 43 MMHG

## 2021-11-04 VITALS — DIASTOLIC BLOOD PRESSURE: 50 MMHG | SYSTOLIC BLOOD PRESSURE: 87 MMHG

## 2021-11-04 VITALS — DIASTOLIC BLOOD PRESSURE: 42 MMHG | SYSTOLIC BLOOD PRESSURE: 88 MMHG

## 2021-11-04 VITALS — SYSTOLIC BLOOD PRESSURE: 93 MMHG | DIASTOLIC BLOOD PRESSURE: 54 MMHG

## 2021-11-04 VITALS — DIASTOLIC BLOOD PRESSURE: 58 MMHG | SYSTOLIC BLOOD PRESSURE: 91 MMHG

## 2021-11-04 VITALS — DIASTOLIC BLOOD PRESSURE: 66 MMHG | SYSTOLIC BLOOD PRESSURE: 101 MMHG

## 2021-11-04 VITALS — DIASTOLIC BLOOD PRESSURE: 56 MMHG | SYSTOLIC BLOOD PRESSURE: 91 MMHG

## 2021-11-04 VITALS — DIASTOLIC BLOOD PRESSURE: 61 MMHG | SYSTOLIC BLOOD PRESSURE: 103 MMHG

## 2021-11-04 VITALS — DIASTOLIC BLOOD PRESSURE: 55 MMHG | SYSTOLIC BLOOD PRESSURE: 97 MMHG

## 2021-11-04 VITALS — SYSTOLIC BLOOD PRESSURE: 93 MMHG | DIASTOLIC BLOOD PRESSURE: 60 MMHG

## 2021-11-04 VITALS — DIASTOLIC BLOOD PRESSURE: 61 MMHG | SYSTOLIC BLOOD PRESSURE: 87 MMHG

## 2021-11-04 VITALS — SYSTOLIC BLOOD PRESSURE: 84 MMHG | DIASTOLIC BLOOD PRESSURE: 55 MMHG

## 2021-11-04 VITALS — SYSTOLIC BLOOD PRESSURE: 113 MMHG | DIASTOLIC BLOOD PRESSURE: 68 MMHG

## 2021-11-04 VITALS — SYSTOLIC BLOOD PRESSURE: 84 MMHG | DIASTOLIC BLOOD PRESSURE: 48 MMHG

## 2021-11-04 VITALS — DIASTOLIC BLOOD PRESSURE: 62 MMHG | SYSTOLIC BLOOD PRESSURE: 87 MMHG

## 2021-11-04 VITALS — SYSTOLIC BLOOD PRESSURE: 88 MMHG | DIASTOLIC BLOOD PRESSURE: 49 MMHG

## 2021-11-04 VITALS — DIASTOLIC BLOOD PRESSURE: 69 MMHG | SYSTOLIC BLOOD PRESSURE: 114 MMHG

## 2021-11-04 VITALS — DIASTOLIC BLOOD PRESSURE: 49 MMHG | SYSTOLIC BLOOD PRESSURE: 82 MMHG

## 2021-11-04 VITALS — DIASTOLIC BLOOD PRESSURE: 58 MMHG | SYSTOLIC BLOOD PRESSURE: 89 MMHG

## 2021-11-04 VITALS — DIASTOLIC BLOOD PRESSURE: 73 MMHG | SYSTOLIC BLOOD PRESSURE: 109 MMHG

## 2021-11-04 VITALS — DIASTOLIC BLOOD PRESSURE: 55 MMHG | SYSTOLIC BLOOD PRESSURE: 90 MMHG

## 2021-11-04 VITALS — SYSTOLIC BLOOD PRESSURE: 95 MMHG | DIASTOLIC BLOOD PRESSURE: 59 MMHG

## 2021-11-04 VITALS — SYSTOLIC BLOOD PRESSURE: 107 MMHG | DIASTOLIC BLOOD PRESSURE: 65 MMHG

## 2021-11-04 VITALS — SYSTOLIC BLOOD PRESSURE: 110 MMHG | DIASTOLIC BLOOD PRESSURE: 65 MMHG

## 2021-11-04 VITALS — DIASTOLIC BLOOD PRESSURE: 61 MMHG | SYSTOLIC BLOOD PRESSURE: 94 MMHG

## 2021-11-04 VITALS — SYSTOLIC BLOOD PRESSURE: 115 MMHG | DIASTOLIC BLOOD PRESSURE: 71 MMHG

## 2021-11-04 VITALS — DIASTOLIC BLOOD PRESSURE: 47 MMHG | SYSTOLIC BLOOD PRESSURE: 85 MMHG

## 2021-11-04 VITALS — SYSTOLIC BLOOD PRESSURE: 94 MMHG | DIASTOLIC BLOOD PRESSURE: 59 MMHG

## 2021-11-04 VITALS — DIASTOLIC BLOOD PRESSURE: 61 MMHG | SYSTOLIC BLOOD PRESSURE: 90 MMHG

## 2021-11-04 LAB
ALBUMIN SERPL-MCNC: 2.2 G/DL (ref 3.4–5)
ALP SERPL-CCNC: 46 U/L (ref 45–117)
ALT SERPL-CCNC: 12 U/L (ref 16–61)
ANION GAP SERPL CALCULATED.3IONS-SCNC: 7 MMOL/L (ref 5–15)
BILIRUB SERPL-MCNC: 7.4 MG/DL (ref 0.2–1)
BUN SERPL-MCNC: 51 MG/DL (ref 7–18)
BUN/CREAT SERPL: 19.5
CALCIUM SERPL-MCNC: 7.7 MG/DL (ref 8.5–10.1)
CHLORIDE SERPL-SCNC: 104 MMOL/L (ref 98–107)
CO2 SERPL-SCNC: 21 MMOL/L (ref 21–32)
GLUCOSE SERPL-MCNC: 224 MG/DL (ref 74–106)
HCT VFR BLD AUTO: 27.7 % (ref 41–53)
HGB BLD-MCNC: 9.3 G/DL (ref 13.5–17.5)
INR PPP: 2.69 (ref 0.9–1.15)
MCH RBC QN AUTO: 33.5 PG (ref 28–32)
MCV RBC AUTO: 99.4 FL (ref 80–100)
NRBC BLD QL AUTO: 0 %
POTASSIUM SERPL-SCNC: 3.5 MMOL/L (ref 3.5–5.1)
PROT SERPL-MCNC: 4 G/DL (ref 6.4–8.2)
SODIUM SERPL-SCNC: 132 MMOL/L (ref 136–145)

## 2021-11-04 RX ADMIN — SODIUM CHLORIDE SCH ML: 9 INJECTION INTRAMUSCULAR; INTRAVENOUS; SUBCUTANEOUS at 18:47

## 2021-11-04 RX ADMIN — PANTOPRAZOLE SODIUM SCH MG: 40 TABLET, DELAYED RELEASE ORAL at 09:56

## 2021-11-04 RX ADMIN — SODIUM CHLORIDE SCH ML: 9 INJECTION INTRAMUSCULAR; INTRAVENOUS; SUBCUTANEOUS at 11:01

## 2021-11-04 RX ADMIN — Medication SCH ML: at 14:00

## 2021-11-04 RX ADMIN — TAMSULOSIN HYDROCHLORIDE SCH MG: 0.4 CAPSULE ORAL at 17:52

## 2021-11-04 RX ADMIN — SODIUM CHLORIDE SCH MCG: 9 INJECTION, SOLUTION INTRAVENOUS at 22:00

## 2021-11-04 RX ADMIN — SODIUM CHLORIDE SCH MCG: 9 INJECTION, SOLUTION INTRAVENOUS at 05:16

## 2021-11-04 RX ADMIN — Medication SCH ML: at 22:00

## 2021-11-04 RX ADMIN — MIDODRINE HYDROCHLORIDE SCH MG: 10 TABLET ORAL at 05:16

## 2021-11-04 RX ADMIN — SODIUM CHLORIDE SCH MCG: 9 INJECTION, SOLUTION INTRAVENOUS at 14:27

## 2021-11-04 RX ADMIN — MIDODRINE HYDROCHLORIDE SCH MG: 10 TABLET ORAL at 12:43

## 2021-11-04 RX ADMIN — MIDODRINE HYDROCHLORIDE SCH MG: 10 TABLET ORAL at 18:37

## 2021-11-04 RX ADMIN — CEFTRIAXONE SODIUM SCH MLS/HR: 1 INJECTION, POWDER, FOR SOLUTION INTRAMUSCULAR; INTRAVENOUS at 05:16

## 2021-11-04 RX ADMIN — SPIRONOLACTONE SCH MG: 25 TABLET, FILM COATED ORAL at 09:56

## 2021-11-04 RX ADMIN — Medication SCH ML: at 05:16

## 2021-11-05 VITALS — SYSTOLIC BLOOD PRESSURE: 78 MMHG | DIASTOLIC BLOOD PRESSURE: 54 MMHG

## 2021-11-05 VITALS — DIASTOLIC BLOOD PRESSURE: 63 MMHG | SYSTOLIC BLOOD PRESSURE: 106 MMHG

## 2021-11-05 VITALS — DIASTOLIC BLOOD PRESSURE: 63 MMHG | SYSTOLIC BLOOD PRESSURE: 103 MMHG

## 2021-11-05 VITALS — DIASTOLIC BLOOD PRESSURE: 54 MMHG | SYSTOLIC BLOOD PRESSURE: 86 MMHG

## 2021-11-05 VITALS — DIASTOLIC BLOOD PRESSURE: 55 MMHG | SYSTOLIC BLOOD PRESSURE: 95 MMHG

## 2021-11-05 VITALS — DIASTOLIC BLOOD PRESSURE: 49 MMHG | SYSTOLIC BLOOD PRESSURE: 86 MMHG

## 2021-11-05 VITALS — SYSTOLIC BLOOD PRESSURE: 91 MMHG | DIASTOLIC BLOOD PRESSURE: 49 MMHG

## 2021-11-05 VITALS — DIASTOLIC BLOOD PRESSURE: 61 MMHG | SYSTOLIC BLOOD PRESSURE: 101 MMHG

## 2021-11-05 VITALS — DIASTOLIC BLOOD PRESSURE: 55 MMHG | SYSTOLIC BLOOD PRESSURE: 98 MMHG

## 2021-11-05 VITALS — SYSTOLIC BLOOD PRESSURE: 84 MMHG | DIASTOLIC BLOOD PRESSURE: 47 MMHG

## 2021-11-05 VITALS — SYSTOLIC BLOOD PRESSURE: 110 MMHG | DIASTOLIC BLOOD PRESSURE: 65 MMHG

## 2021-11-05 VITALS — DIASTOLIC BLOOD PRESSURE: 60 MMHG | SYSTOLIC BLOOD PRESSURE: 103 MMHG

## 2021-11-05 VITALS — SYSTOLIC BLOOD PRESSURE: 101 MMHG | DIASTOLIC BLOOD PRESSURE: 57 MMHG

## 2021-11-05 VITALS — SYSTOLIC BLOOD PRESSURE: 99 MMHG | DIASTOLIC BLOOD PRESSURE: 64 MMHG

## 2021-11-05 VITALS — SYSTOLIC BLOOD PRESSURE: 109 MMHG | DIASTOLIC BLOOD PRESSURE: 67 MMHG

## 2021-11-05 VITALS — SYSTOLIC BLOOD PRESSURE: 104 MMHG | DIASTOLIC BLOOD PRESSURE: 59 MMHG

## 2021-11-05 VITALS — DIASTOLIC BLOOD PRESSURE: 64 MMHG | SYSTOLIC BLOOD PRESSURE: 97 MMHG

## 2021-11-05 VITALS — SYSTOLIC BLOOD PRESSURE: 95 MMHG | DIASTOLIC BLOOD PRESSURE: 48 MMHG

## 2021-11-05 VITALS — DIASTOLIC BLOOD PRESSURE: 62 MMHG | SYSTOLIC BLOOD PRESSURE: 92 MMHG

## 2021-11-05 VITALS — DIASTOLIC BLOOD PRESSURE: 59 MMHG | SYSTOLIC BLOOD PRESSURE: 90 MMHG

## 2021-11-05 VITALS — SYSTOLIC BLOOD PRESSURE: 94 MMHG | DIASTOLIC BLOOD PRESSURE: 59 MMHG

## 2021-11-05 VITALS — SYSTOLIC BLOOD PRESSURE: 94 MMHG | DIASTOLIC BLOOD PRESSURE: 60 MMHG

## 2021-11-05 VITALS — DIASTOLIC BLOOD PRESSURE: 62 MMHG | SYSTOLIC BLOOD PRESSURE: 102 MMHG

## 2021-11-05 VITALS — DIASTOLIC BLOOD PRESSURE: 58 MMHG | SYSTOLIC BLOOD PRESSURE: 96 MMHG

## 2021-11-05 VITALS — SYSTOLIC BLOOD PRESSURE: 92 MMHG | DIASTOLIC BLOOD PRESSURE: 63 MMHG

## 2021-11-05 VITALS — SYSTOLIC BLOOD PRESSURE: 86 MMHG | DIASTOLIC BLOOD PRESSURE: 44 MMHG

## 2021-11-05 VITALS — DIASTOLIC BLOOD PRESSURE: 64 MMHG | SYSTOLIC BLOOD PRESSURE: 93 MMHG

## 2021-11-05 VITALS — DIASTOLIC BLOOD PRESSURE: 50 MMHG | SYSTOLIC BLOOD PRESSURE: 87 MMHG

## 2021-11-05 LAB
ANION GAP SERPL CALCULATED.3IONS-SCNC: 9 MMOL/L (ref 5–15)
BUN SERPL-MCNC: 50 MG/DL (ref 7–18)
BUN/CREAT SERPL: 19.5
CALCIUM SERPL-MCNC: 8.4 MG/DL (ref 8.5–10.1)
CHLORIDE SERPL-SCNC: 104 MMOL/L (ref 98–107)
CO2 SERPL-SCNC: 21 MMOL/L (ref 21–32)
GLUCOSE SERPL-MCNC: 123 MG/DL (ref 74–106)
HCT VFR BLD AUTO: 29.4 % (ref 41–53)
HGB BLD-MCNC: 10.3 G/DL (ref 13.5–17.5)
MCH RBC QN AUTO: 34.2 PG (ref 28–32)
MCV RBC AUTO: 97.3 FL (ref 80–100)
NRBC BLD QL AUTO: 0 %
POTASSIUM SERPL-SCNC: 3.7 MMOL/L (ref 3.5–5.1)
SODIUM SERPL-SCNC: 134 MMOL/L (ref 136–145)

## 2021-11-05 RX ADMIN — ONDANSETRON HYDROCHLORIDE PRN MG: 2 INJECTION, SOLUTION INTRAMUSCULAR; INTRAVENOUS at 01:28

## 2021-11-05 RX ADMIN — NOREPINEPHRINE BITARTRATE SCH MLS/HR: 1 INJECTION, SOLUTION, CONCENTRATE INTRAVENOUS at 19:46

## 2021-11-05 RX ADMIN — SODIUM CHLORIDE SCH MCG: 9 INJECTION, SOLUTION INTRAVENOUS at 14:00

## 2021-11-05 RX ADMIN — PANTOPRAZOLE SODIUM SCH MG: 40 TABLET, DELAYED RELEASE ORAL at 11:08

## 2021-11-05 RX ADMIN — SODIUM CHLORIDE SCH MCG: 9 INJECTION, SOLUTION INTRAVENOUS at 21:48

## 2021-11-05 RX ADMIN — Medication SCH ML: at 21:30

## 2021-11-05 RX ADMIN — SODIUM CHLORIDE SCH ML: 9 INJECTION INTRAMUSCULAR; INTRAVENOUS; SUBCUTANEOUS at 21:22

## 2021-11-05 RX ADMIN — MIDODRINE HYDROCHLORIDE SCH MG: 10 TABLET ORAL at 11:09

## 2021-11-05 RX ADMIN — MIDODRINE HYDROCHLORIDE SCH MG: 10 TABLET ORAL at 05:46

## 2021-11-05 RX ADMIN — SODIUM CHLORIDE SCH ML: 9 INJECTION INTRAMUSCULAR; INTRAVENOUS; SUBCUTANEOUS at 11:08

## 2021-11-05 RX ADMIN — MIDODRINE HYDROCHLORIDE SCH MG: 10 TABLET ORAL at 21:00

## 2021-11-05 RX ADMIN — SODIUM CHLORIDE SCH MCG: 9 INJECTION, SOLUTION INTRAVENOUS at 05:47

## 2021-11-05 RX ADMIN — Medication SCH ML: at 15:14

## 2021-11-05 RX ADMIN — NOREPINEPHRINE BITARTRATE SCH MLS/HR: 1 INJECTION, SOLUTION, CONCENTRATE INTRAVENOUS at 04:32

## 2021-11-05 RX ADMIN — Medication SCH ML: at 05:46

## 2021-11-06 VITALS — DIASTOLIC BLOOD PRESSURE: 99 MMHG | SYSTOLIC BLOOD PRESSURE: 101 MMHG

## 2021-11-06 RX ADMIN — PANTOPRAZOLE SODIUM SCH MG: 40 TABLET, DELAYED RELEASE ORAL at 10:46

## 2021-11-06 RX ADMIN — MIDODRINE HYDROCHLORIDE SCH MG: 10 TABLET ORAL at 06:00

## 2021-11-06 RX ADMIN — MORPHINE SULFATE PRN MG: 2 INJECTION, SOLUTION INTRAMUSCULAR; INTRAVENOUS at 04:30

## 2021-11-06 RX ADMIN — SODIUM CHLORIDE SCH ML: 9 INJECTION INTRAMUSCULAR; INTRAVENOUS; SUBCUTANEOUS at 10:46

## 2021-11-06 RX ADMIN — SODIUM CHLORIDE SCH MCG: 9 INJECTION, SOLUTION INTRAVENOUS at 07:01

## 2021-11-09 ENCOUNTER — HOSPITAL ENCOUNTER (INPATIENT)
Dept: HOSPITAL 15 - ER | Age: 51
LOS: 6 days | Discharge: HOSPICE | DRG: 720 | End: 2021-11-15
Attending: FAMILY MEDICINE | Admitting: INTERNAL MEDICINE
Payer: MEDICAID

## 2021-11-09 VITALS — WEIGHT: 156.31 LBS | BODY MASS INDEX: 23.69 KG/M2 | HEIGHT: 68 IN

## 2021-11-09 DIAGNOSIS — D64.9: ICD-10-CM

## 2021-11-09 DIAGNOSIS — A41.59: Primary | ICD-10-CM

## 2021-11-09 DIAGNOSIS — N39.0: ICD-10-CM

## 2021-11-09 DIAGNOSIS — K70.31: ICD-10-CM

## 2021-11-09 DIAGNOSIS — R65.21: ICD-10-CM

## 2021-11-09 DIAGNOSIS — K72.10: ICD-10-CM

## 2021-11-09 DIAGNOSIS — E11.22: ICD-10-CM

## 2021-11-09 DIAGNOSIS — N18.4: ICD-10-CM

## 2021-11-09 DIAGNOSIS — F32.A: ICD-10-CM

## 2021-11-09 DIAGNOSIS — E43: ICD-10-CM

## 2021-11-09 DIAGNOSIS — Z79.899: ICD-10-CM

## 2021-11-09 DIAGNOSIS — Z16.12: ICD-10-CM

## 2021-11-09 DIAGNOSIS — K76.7: ICD-10-CM

## 2021-11-09 DIAGNOSIS — Z20.822: ICD-10-CM

## 2021-11-09 DIAGNOSIS — I12.9: ICD-10-CM

## 2021-11-09 DIAGNOSIS — N17.0: ICD-10-CM

## 2021-11-09 DIAGNOSIS — N43.3: ICD-10-CM

## 2021-11-09 LAB
ALBUMIN SERPL-MCNC: 1.9 G/DL (ref 3.4–5)
ALP SERPL-CCNC: 63 U/L (ref 45–117)
ALT SERPL-CCNC: 14 U/L (ref 16–61)
ANION GAP SERPL CALCULATED.3IONS-SCNC: 11 MMOL/L (ref 5–15)
APTT PPP: 66.1 SEC (ref 23.6–33)
BILIRUB SERPL-MCNC: 8.3 MG/DL (ref 0.2–1)
BUN SERPL-MCNC: 68 MG/DL (ref 7–18)
BUN/CREAT SERPL: 15.4
CALCIUM SERPL-MCNC: 6.7 MG/DL (ref 8.5–10.1)
CHLORIDE SERPL-SCNC: 106 MMOL/L (ref 98–107)
CHOLEST SERPL-MCNC: < 50 MG/DL (ref ?–200)
CO2 SERPL-SCNC: 17 MMOL/L (ref 21–32)
GLUCOSE SERPL-MCNC: 100 MG/DL (ref 74–106)
HCT VFR BLD AUTO: 26.2 % (ref 41–53)
HDLC SERPL-MCNC: 9 MG/DL (ref 40–59)
HGB BLD-MCNC: 9.1 G/DL (ref 13.5–17.5)
INR PPP: 2.75 (ref 0.9–1.15)
LACTATE PLASV-SCNC: 2.8 MMOL/L (ref 0.4–2)
LIPASE SERPL-CCNC: 327 U/L (ref 73–393)
MCH RBC QN AUTO: 34.2 PG (ref 28–32)
MCV RBC AUTO: 98.1 FL (ref 80–100)
NRBC BLD QL AUTO: 0.1 %
POTASSIUM SERPL-SCNC: 3.7 MMOL/L (ref 3.5–5.1)
PROT SERPL-MCNC: 4.4 G/DL (ref 6.4–8.2)
SODIUM SERPL-SCNC: 134 MMOL/L (ref 136–145)
TRIGL SERPL-MCNC: 66 MG/DL (ref ?–150)
WBC CLUMPS UR QL AUTO: PRESENT /HPF

## 2021-11-09 PROCEDURE — 36415 COLL VENOUS BLD VENIPUNCTURE: CPT

## 2021-11-09 PROCEDURE — 80307 DRUG TEST PRSMV CHEM ANLYZR: CPT

## 2021-11-09 PROCEDURE — 96375 TX/PRO/DX INJ NEW DRUG ADDON: CPT

## 2021-11-09 PROCEDURE — 36600 WITHDRAWAL OF ARTERIAL BLOOD: CPT

## 2021-11-09 PROCEDURE — 74176 CT ABD & PELVIS W/O CONTRAST: CPT

## 2021-11-09 PROCEDURE — 96365 THER/PROPH/DIAG IV INF INIT: CPT

## 2021-11-09 PROCEDURE — 81001 URINALYSIS AUTO W/SCOPE: CPT

## 2021-11-09 PROCEDURE — 83605 ASSAY OF LACTIC ACID: CPT

## 2021-11-09 PROCEDURE — 80048 BASIC METABOLIC PNL TOTAL CA: CPT

## 2021-11-09 PROCEDURE — 0W9G3ZZ DRAINAGE OF PERITONEAL CAVITY, PERCUTANEOUS APPROACH: ICD-10-PCS | Performed by: RADIOLOGY

## 2021-11-09 PROCEDURE — 84156 ASSAY OF PROTEIN URINE: CPT

## 2021-11-09 PROCEDURE — 80061 LIPID PANEL: CPT

## 2021-11-09 PROCEDURE — 84550 ASSAY OF BLOOD/URIC ACID: CPT

## 2021-11-09 PROCEDURE — 82140 ASSAY OF AMMONIA: CPT

## 2021-11-09 PROCEDURE — 80202 ASSAY OF VANCOMYCIN: CPT

## 2021-11-09 PROCEDURE — 85610 PROTHROMBIN TIME: CPT

## 2021-11-09 PROCEDURE — 76942 ECHO GUIDE FOR BIOPSY: CPT

## 2021-11-09 PROCEDURE — 87186 SC STD MICRODIL/AGAR DIL: CPT

## 2021-11-09 PROCEDURE — 85025 COMPLETE CBC W/AUTO DIFF WBC: CPT

## 2021-11-09 PROCEDURE — 87081 CULTURE SCREEN ONLY: CPT

## 2021-11-09 PROCEDURE — 84300 ASSAY OF URINE SODIUM: CPT

## 2021-11-09 PROCEDURE — 87088 URINE BACTERIA CULTURE: CPT

## 2021-11-09 PROCEDURE — 87040 BLOOD CULTURE FOR BACTERIA: CPT

## 2021-11-09 PROCEDURE — 84100 ASSAY OF PHOSPHORUS: CPT

## 2021-11-09 PROCEDURE — 87086 URINE CULTURE/COLONY COUNT: CPT

## 2021-11-09 PROCEDURE — 82570 ASSAY OF URINE CREATININE: CPT

## 2021-11-09 PROCEDURE — 82805 BLOOD GASES W/O2 SATURATION: CPT

## 2021-11-09 PROCEDURE — 83735 ASSAY OF MAGNESIUM: CPT

## 2021-11-09 PROCEDURE — 99291 CRITICAL CARE FIRST HOUR: CPT

## 2021-11-09 PROCEDURE — 85730 THROMBOPLASTIN TIME PARTIAL: CPT

## 2021-11-09 PROCEDURE — 87426 SARSCOV CORONAVIRUS AG IA: CPT

## 2021-11-09 PROCEDURE — 87077 CULTURE AEROBIC IDENTIFY: CPT

## 2021-11-09 PROCEDURE — 96367 TX/PROPH/DG ADDL SEQ IV INF: CPT

## 2021-11-09 PROCEDURE — 80053 COMPREHEN METABOLIC PANEL: CPT

## 2021-11-09 PROCEDURE — 84443 ASSAY THYROID STIM HORMONE: CPT

## 2021-11-09 PROCEDURE — 84484 ASSAY OF TROPONIN QUANT: CPT

## 2021-11-09 PROCEDURE — 83880 ASSAY OF NATRIURETIC PEPTIDE: CPT

## 2021-11-09 PROCEDURE — 82565 ASSAY OF CREATININE: CPT

## 2021-11-09 PROCEDURE — 83036 HEMOGLOBIN GLYCOSYLATED A1C: CPT

## 2021-11-09 PROCEDURE — 74018 RADEX ABDOMEN 1 VIEW: CPT

## 2021-11-09 PROCEDURE — 83690 ASSAY OF LIPASE: CPT

## 2021-11-09 PROCEDURE — 82962 GLUCOSE BLOOD TEST: CPT

## 2021-11-09 PROCEDURE — 93005 ELECTROCARDIOGRAM TRACING: CPT

## 2021-11-09 RX ADMIN — LACTULOSE SCH ML: 10 SOLUTION ORAL; RECTAL at 23:47

## 2021-11-09 RX ADMIN — Medication SCH ML: at 21:11

## 2021-11-09 RX ADMIN — ALBUMIN (HUMAN) SCH MLS/HR: 0.25 INJECTION, SOLUTION INTRAVENOUS at 18:18

## 2021-11-09 RX ADMIN — ALBUMIN (HUMAN) SCH MLS/HR: 0.25 INJECTION, SOLUTION INTRAVENOUS at 18:12

## 2021-11-09 RX ADMIN — NOREPINEPHRINE BITARTRATE SCH MLS/HR: 1 INJECTION, SOLUTION, CONCENTRATE INTRAVENOUS at 12:45

## 2021-11-09 RX ADMIN — Medication SCH MG: at 21:11

## 2021-11-10 LAB
ALBUMIN SERPL-MCNC: 2.7 G/DL (ref 3.4–5)
ALCOHOL, URINE: 5 MG/DL (ref 0–10)
ALP SERPL-CCNC: 66 U/L (ref 45–117)
ALT SERPL-CCNC: 16 U/L (ref 16–61)
AMPHETAMINES UR QL SCN: NEGATIVE
ANION GAP SERPL CALCULATED.3IONS-SCNC: 13 MMOL/L (ref 5–15)
APTT PPP: 65.2 SEC (ref 23.6–33)
BARBITURATES UR QL SCN: NEGATIVE
BENZODIAZ UR QL SCN: NEGATIVE
BILIRUB SERPL-MCNC: 10.1 MG/DL (ref 0.2–1)
BUN SERPL-MCNC: 81 MG/DL (ref 7–18)
BUN/CREAT SERPL: 16.3
BZE UR QL SCN: NEGATIVE
CALCIUM SERPL-MCNC: 8.1 MG/DL (ref 8.5–10.1)
CANNABINOIDS UR QL SCN: NEGATIVE
CHLORIDE SERPL-SCNC: 104 MMOL/L (ref 98–107)
CO2 SERPL-SCNC: 16 MMOL/L (ref 21–32)
GLUCOSE SERPL-MCNC: 143 MG/DL (ref 74–106)
HCT VFR BLD AUTO: 28.8 % (ref 41–53)
HGB BLD-MCNC: 9.9 G/DL (ref 13.5–17.5)
INR PPP: 2.84 (ref 0.9–1.15)
MAGNESIUM SERPL-MCNC: 3 MG/DL (ref 1.6–2.6)
MCH RBC QN AUTO: 33.8 PG (ref 28–32)
MCV RBC AUTO: 98.4 FL (ref 80–100)
NRBC BLD QL AUTO: 0.2 %
OPIATES UR QL SCN: NEGATIVE
PCP UR QL SCN: NEGATIVE
POTASSIUM SERPL-SCNC: 4.6 MMOL/L (ref 3.5–5.1)
PROT SERPL-MCNC: 5.4 G/DL (ref 6.4–8.2)
PROT UR-MCNC: 97.3 MG/DL (ref 0–11.9)
SODIUM SERPL-SCNC: 133 MMOL/L (ref 136–145)
URATE SERPL-MCNC: 11.3 MG/DL (ref 3.5–7.2)

## 2021-11-10 RX ADMIN — Medication SCH ML: at 05:00

## 2021-11-10 RX ADMIN — Medication SCH ML: at 02:30

## 2021-11-10 RX ADMIN — LACTULOSE SCH ML: 10 SOLUTION ORAL; RECTAL at 05:01

## 2021-11-10 RX ADMIN — Medication SCH MG: at 05:01

## 2021-11-10 RX ADMIN — Medication SCH ML: at 22:15

## 2021-11-10 RX ADMIN — Medication SCH MG: at 22:15

## 2021-11-10 RX ADMIN — SODIUM CHLORIDE SCH MCG: 9 INJECTION, SOLUTION INTRAVENOUS at 14:23

## 2021-11-10 RX ADMIN — SODIUM CHLORIDE SCH MCG: 9 INJECTION, SOLUTION INTRAVENOUS at 22:16

## 2021-11-10 RX ADMIN — NOREPINEPHRINE BITARTRATE SCH MLS/HR: 1 INJECTION, SOLUTION, CONCENTRATE INTRAVENOUS at 10:40

## 2021-11-10 RX ADMIN — Medication SCH ML: at 10:38

## 2021-11-10 RX ADMIN — MIDODRINE HYDROCHLORIDE SCH MG: 10 TABLET ORAL at 18:16

## 2021-11-10 RX ADMIN — ALBUMIN (HUMAN) SCH MLS/HR: 0.25 INJECTION, SOLUTION INTRAVENOUS at 23:15

## 2021-11-10 RX ADMIN — Medication SCH ML: at 18:16

## 2021-11-10 RX ADMIN — Medication SCH ML: at 14:22

## 2021-11-10 RX ADMIN — CEFTRIAXONE SODIUM SCH MLS/HR: 1 INJECTION, POWDER, FOR SOLUTION INTRAMUSCULAR; INTRAVENOUS at 10:38

## 2021-11-10 RX ADMIN — ALBUMIN (HUMAN) SCH MLS/HR: 0.25 INJECTION, SOLUTION INTRAVENOUS at 14:22

## 2021-11-10 RX ADMIN — Medication SCH MG: at 14:23

## 2021-11-10 RX ADMIN — PANTOPRAZOLE SODIUM SCH MG: 40 INJECTION, POWDER, FOR SOLUTION INTRAVENOUS at 10:38

## 2021-11-10 RX ADMIN — DEXTROSE SCH MLS/HR: 5 SOLUTION INTRAVENOUS at 14:21

## 2021-11-11 VITALS — SYSTOLIC BLOOD PRESSURE: 102 MMHG | DIASTOLIC BLOOD PRESSURE: 64 MMHG

## 2021-11-11 VITALS — SYSTOLIC BLOOD PRESSURE: 98 MMHG | DIASTOLIC BLOOD PRESSURE: 58 MMHG

## 2021-11-11 VITALS — SYSTOLIC BLOOD PRESSURE: 100 MMHG | DIASTOLIC BLOOD PRESSURE: 55 MMHG

## 2021-11-11 VITALS — DIASTOLIC BLOOD PRESSURE: 61 MMHG | SYSTOLIC BLOOD PRESSURE: 94 MMHG

## 2021-11-11 VITALS — SYSTOLIC BLOOD PRESSURE: 107 MMHG | DIASTOLIC BLOOD PRESSURE: 69 MMHG

## 2021-11-11 VITALS — SYSTOLIC BLOOD PRESSURE: 107 MMHG | DIASTOLIC BLOOD PRESSURE: 67 MMHG

## 2021-11-11 VITALS — SYSTOLIC BLOOD PRESSURE: 107 MMHG | DIASTOLIC BLOOD PRESSURE: 66 MMHG

## 2021-11-11 VITALS — DIASTOLIC BLOOD PRESSURE: 63 MMHG | SYSTOLIC BLOOD PRESSURE: 104 MMHG

## 2021-11-11 VITALS — DIASTOLIC BLOOD PRESSURE: 69 MMHG | SYSTOLIC BLOOD PRESSURE: 113 MMHG

## 2021-11-11 VITALS — DIASTOLIC BLOOD PRESSURE: 63 MMHG | SYSTOLIC BLOOD PRESSURE: 106 MMHG

## 2021-11-11 VITALS — SYSTOLIC BLOOD PRESSURE: 111 MMHG | DIASTOLIC BLOOD PRESSURE: 70 MMHG

## 2021-11-11 VITALS — DIASTOLIC BLOOD PRESSURE: 67 MMHG | SYSTOLIC BLOOD PRESSURE: 110 MMHG

## 2021-11-11 VITALS — SYSTOLIC BLOOD PRESSURE: 106 MMHG | DIASTOLIC BLOOD PRESSURE: 66 MMHG

## 2021-11-11 VITALS — DIASTOLIC BLOOD PRESSURE: 69 MMHG | SYSTOLIC BLOOD PRESSURE: 109 MMHG

## 2021-11-11 VITALS — SYSTOLIC BLOOD PRESSURE: 103 MMHG | DIASTOLIC BLOOD PRESSURE: 66 MMHG

## 2021-11-11 VITALS — DIASTOLIC BLOOD PRESSURE: 62 MMHG | SYSTOLIC BLOOD PRESSURE: 107 MMHG

## 2021-11-11 VITALS — SYSTOLIC BLOOD PRESSURE: 102 MMHG | DIASTOLIC BLOOD PRESSURE: 61 MMHG

## 2021-11-11 VITALS — SYSTOLIC BLOOD PRESSURE: 92 MMHG | DIASTOLIC BLOOD PRESSURE: 53 MMHG

## 2021-11-11 VITALS — SYSTOLIC BLOOD PRESSURE: 120 MMHG | DIASTOLIC BLOOD PRESSURE: 79 MMHG

## 2021-11-11 VITALS — DIASTOLIC BLOOD PRESSURE: 64 MMHG | SYSTOLIC BLOOD PRESSURE: 104 MMHG

## 2021-11-11 VITALS — DIASTOLIC BLOOD PRESSURE: 58 MMHG | SYSTOLIC BLOOD PRESSURE: 99 MMHG

## 2021-11-11 VITALS — SYSTOLIC BLOOD PRESSURE: 106 MMHG | DIASTOLIC BLOOD PRESSURE: 68 MMHG

## 2021-11-11 VITALS — SYSTOLIC BLOOD PRESSURE: 101 MMHG | DIASTOLIC BLOOD PRESSURE: 66 MMHG

## 2021-11-11 VITALS — SYSTOLIC BLOOD PRESSURE: 98 MMHG | DIASTOLIC BLOOD PRESSURE: 61 MMHG

## 2021-11-11 VITALS — SYSTOLIC BLOOD PRESSURE: 102 MMHG | DIASTOLIC BLOOD PRESSURE: 66 MMHG

## 2021-11-11 VITALS — SYSTOLIC BLOOD PRESSURE: 108 MMHG | DIASTOLIC BLOOD PRESSURE: 62 MMHG

## 2021-11-11 VITALS — DIASTOLIC BLOOD PRESSURE: 65 MMHG | SYSTOLIC BLOOD PRESSURE: 113 MMHG

## 2021-11-11 VITALS — SYSTOLIC BLOOD PRESSURE: 112 MMHG | DIASTOLIC BLOOD PRESSURE: 71 MMHG

## 2021-11-11 VITALS — SYSTOLIC BLOOD PRESSURE: 111 MMHG | DIASTOLIC BLOOD PRESSURE: 66 MMHG

## 2021-11-11 VITALS — DIASTOLIC BLOOD PRESSURE: 59 MMHG | SYSTOLIC BLOOD PRESSURE: 102 MMHG

## 2021-11-11 VITALS — DIASTOLIC BLOOD PRESSURE: 65 MMHG | SYSTOLIC BLOOD PRESSURE: 110 MMHG

## 2021-11-11 LAB
ALBUMIN SERPL-MCNC: 3.4 G/DL (ref 3.4–5)
ALP SERPL-CCNC: 56 U/L (ref 45–117)
ALT SERPL-CCNC: 14 U/L (ref 16–61)
ANION GAP SERPL CALCULATED.3IONS-SCNC: 12 MMOL/L (ref 5–15)
BILIRUB SERPL-MCNC: 8.3 MG/DL (ref 0.2–1)
BUN SERPL-MCNC: 74 MG/DL (ref 7–18)
BUN/CREAT SERPL: 18.2
CALCIUM SERPL-MCNC: 7.9 MG/DL (ref 8.5–10.1)
CHLORIDE SERPL-SCNC: 105 MMOL/L (ref 98–107)
CO2 SERPL-SCNC: 18 MMOL/L (ref 21–32)
GLUCOSE SERPL-MCNC: 138 MG/DL (ref 74–106)
HCT VFR BLD AUTO: 25.5 % (ref 41–53)
HGB BLD-MCNC: 8.8 G/DL (ref 13.5–17.5)
MCH RBC QN AUTO: 33.9 PG (ref 28–32)
MCV RBC AUTO: 98.2 FL (ref 80–100)
NRBC BLD QL AUTO: 0 %
POTASSIUM SERPL-SCNC: 3.4 MMOL/L (ref 3.5–5.1)
PROT SERPL-MCNC: 5.7 G/DL (ref 6.4–8.2)
SODIUM SERPL-SCNC: 135 MMOL/L (ref 136–145)

## 2021-11-11 RX ADMIN — Medication SCH ML: at 10:53

## 2021-11-11 RX ADMIN — DEXTROSE SCH MLS/HR: 5 SOLUTION INTRAVENOUS at 02:16

## 2021-11-11 RX ADMIN — Medication SCH ML: at 06:00

## 2021-11-11 RX ADMIN — SODIUM CHLORIDE SCH MCG: 9 INJECTION, SOLUTION INTRAVENOUS at 14:00

## 2021-11-11 RX ADMIN — DEXTROSE SCH MLS/HR: 5 SOLUTION INTRAVENOUS at 15:14

## 2021-11-11 RX ADMIN — LACTULOSE SCH ML: 10 SOLUTION ORAL; RECTAL at 18:00

## 2021-11-11 RX ADMIN — PANTOPRAZOLE SODIUM SCH MG: 40 INJECTION, POWDER, FOR SOLUTION INTRAVENOUS at 10:50

## 2021-11-11 RX ADMIN — MIDODRINE HYDROCHLORIDE SCH MG: 10 TABLET ORAL at 12:37

## 2021-11-11 RX ADMIN — SODIUM CHLORIDE SCH MCG: 9 INJECTION, SOLUTION INTRAVENOUS at 22:05

## 2021-11-11 RX ADMIN — NOREPINEPHRINE BITARTRATE SCH MLS/HR: 1 INJECTION, SOLUTION, CONCENTRATE INTRAVENOUS at 15:39

## 2021-11-11 RX ADMIN — LACTULOSE SCH ML: 10 SOLUTION ORAL; RECTAL at 12:00

## 2021-11-11 RX ADMIN — MIDODRINE HYDROCHLORIDE SCH MG: 10 TABLET ORAL at 06:00

## 2021-11-11 RX ADMIN — DOPAMINE HYDROCHLORIDE IN DEXTROSE SCH MLS/HR: 1.6 INJECTION, SOLUTION INTRAVENOUS at 13:51

## 2021-11-11 RX ADMIN — SODIUM CHLORIDE SCH MCG: 9 INJECTION, SOLUTION INTRAVENOUS at 06:35

## 2021-11-11 RX ADMIN — Medication SCH MG: at 22:05

## 2021-11-11 RX ADMIN — Medication SCH ML: at 02:15

## 2021-11-11 RX ADMIN — MIDODRINE HYDROCHLORIDE SCH MG: 10 TABLET ORAL at 17:47

## 2021-11-11 RX ADMIN — Medication SCH MG: at 15:33

## 2021-11-11 RX ADMIN — CEFTRIAXONE SODIUM SCH MLS/HR: 1 INJECTION, POWDER, FOR SOLUTION INTRAMUSCULAR; INTRAVENOUS at 10:52

## 2021-11-11 RX ADMIN — ALBUMIN (HUMAN) SCH MLS/HR: 0.25 INJECTION, SOLUTION INTRAVENOUS at 05:35

## 2021-11-11 RX ADMIN — Medication SCH MG: at 10:53

## 2021-11-11 RX ADMIN — Medication SCH MG: at 06:00

## 2021-11-11 RX ADMIN — FOLIC ACID SCH MG: 1 TABLET ORAL at 10:53

## 2021-11-12 VITALS — SYSTOLIC BLOOD PRESSURE: 94 MMHG | DIASTOLIC BLOOD PRESSURE: 64 MMHG

## 2021-11-12 VITALS — SYSTOLIC BLOOD PRESSURE: 110 MMHG | DIASTOLIC BLOOD PRESSURE: 76 MMHG

## 2021-11-12 VITALS — DIASTOLIC BLOOD PRESSURE: 65 MMHG | SYSTOLIC BLOOD PRESSURE: 113 MMHG

## 2021-11-12 VITALS — DIASTOLIC BLOOD PRESSURE: 60 MMHG | SYSTOLIC BLOOD PRESSURE: 93 MMHG

## 2021-11-12 VITALS — SYSTOLIC BLOOD PRESSURE: 100 MMHG | DIASTOLIC BLOOD PRESSURE: 44 MMHG

## 2021-11-12 VITALS — DIASTOLIC BLOOD PRESSURE: 64 MMHG | SYSTOLIC BLOOD PRESSURE: 94 MMHG

## 2021-11-12 RX ADMIN — Medication SCH MG: at 10:23

## 2021-11-12 RX ADMIN — Medication SCH MG: at 22:21

## 2021-11-12 RX ADMIN — Medication SCH ML: at 12:00

## 2021-11-12 RX ADMIN — DEXTROSE SCH MLS/HR: 5 SOLUTION INTRAVENOUS at 18:05

## 2021-11-12 RX ADMIN — Medication SCH MG: at 05:50

## 2021-11-12 RX ADMIN — MIDODRINE HYDROCHLORIDE SCH MG: 10 TABLET ORAL at 12:00

## 2021-11-12 RX ADMIN — MIDODRINE HYDROCHLORIDE SCH MG: 10 TABLET ORAL at 05:49

## 2021-11-12 RX ADMIN — SODIUM CHLORIDE SCH MCG: 9 INJECTION, SOLUTION INTRAVENOUS at 05:50

## 2021-11-12 RX ADMIN — Medication SCH ML: at 12:55

## 2021-11-12 RX ADMIN — MIDODRINE HYDROCHLORIDE SCH MG: 10 TABLET ORAL at 18:00

## 2021-11-12 RX ADMIN — Medication SCH MG: at 14:00

## 2021-11-12 RX ADMIN — Medication SCH ML: at 18:52

## 2021-11-12 RX ADMIN — FOLIC ACID SCH MG: 1 TABLET ORAL at 10:22

## 2021-11-12 RX ADMIN — SODIUM CHLORIDE SCH MCG: 9 INJECTION, SOLUTION INTRAVENOUS at 15:07

## 2021-11-12 RX ADMIN — PANTOPRAZOLE SODIUM SCH MG: 40 INJECTION, POWDER, FOR SOLUTION INTRAVENOUS at 10:22

## 2021-11-12 RX ADMIN — Medication SCH ML: at 00:41

## 2021-11-12 RX ADMIN — Medication SCH ML: at 05:50

## 2021-11-12 RX ADMIN — Medication SCH ML: at 23:48

## 2021-11-12 RX ADMIN — MIDODRINE HYDROCHLORIDE SCH MG: 10 TABLET ORAL at 18:52

## 2021-11-12 RX ADMIN — MIDODRINE HYDROCHLORIDE SCH MG: 10 TABLET ORAL at 12:55

## 2021-11-12 RX ADMIN — DEXTROSE SCH MLS/HR: 5 SOLUTION INTRAVENOUS at 00:28

## 2021-11-12 RX ADMIN — Medication SCH ML: at 18:00

## 2021-11-12 RX ADMIN — DOPAMINE HYDROCHLORIDE IN DEXTROSE SCH MLS/HR: 1.6 INJECTION, SOLUTION INTRAVENOUS at 12:57

## 2021-11-12 RX ADMIN — DEXTROSE SCH MLS/HR: 5 SOLUTION INTRAVENOUS at 09:40

## 2021-11-12 RX ADMIN — SODIUM CHLORIDE SCH MCG: 9 INJECTION, SOLUTION INTRAVENOUS at 22:00

## 2021-11-12 RX ADMIN — DEXTROSE SCH MLS/HR: 5 SOLUTION INTRAVENOUS at 23:51

## 2021-11-12 RX ADMIN — CEFTRIAXONE SODIUM SCH MLS/HR: 1 INJECTION, POWDER, FOR SOLUTION INTRAMUSCULAR; INTRAVENOUS at 10:22

## 2021-11-12 RX ADMIN — SODIUM CHLORIDE SCH MCG: 9 INJECTION, SOLUTION INTRAVENOUS at 22:17

## 2021-11-13 VITALS — SYSTOLIC BLOOD PRESSURE: 119 MMHG | DIASTOLIC BLOOD PRESSURE: 57 MMHG

## 2021-11-13 VITALS — DIASTOLIC BLOOD PRESSURE: 62 MMHG | SYSTOLIC BLOOD PRESSURE: 101 MMHG

## 2021-11-13 VITALS — SYSTOLIC BLOOD PRESSURE: 107 MMHG | DIASTOLIC BLOOD PRESSURE: 69 MMHG

## 2021-11-13 VITALS — DIASTOLIC BLOOD PRESSURE: 58 MMHG | SYSTOLIC BLOOD PRESSURE: 96 MMHG

## 2021-11-13 VITALS — DIASTOLIC BLOOD PRESSURE: 69 MMHG | SYSTOLIC BLOOD PRESSURE: 100 MMHG

## 2021-11-13 LAB
ALBUMIN SERPL-MCNC: 2.7 G/DL (ref 3.4–5)
ALP SERPL-CCNC: 64 U/L (ref 45–117)
ALT SERPL-CCNC: 16 U/L (ref 16–61)
ANION GAP SERPL CALCULATED.3IONS-SCNC: 10 MMOL/L (ref 5–15)
BILIRUB SERPL-MCNC: 8.3 MG/DL (ref 0.2–1)
BUN SERPL-MCNC: 57 MG/DL (ref 7–18)
BUN/CREAT SERPL: 20
CALCIUM SERPL-MCNC: 7.9 MG/DL (ref 8.5–10.1)
CHLORIDE SERPL-SCNC: 105 MMOL/L (ref 98–107)
CO2 SERPL-SCNC: 22 MMOL/L (ref 21–32)
GLUCOSE SERPL-MCNC: 90 MG/DL (ref 74–106)
HCT VFR BLD AUTO: 26.7 % (ref 41–53)
HGB BLD-MCNC: 9.4 G/DL (ref 13.5–17.5)
INR PPP: 2.74 (ref 0.9–1.15)
MCH RBC QN AUTO: 34.2 PG (ref 28–32)
MCV RBC AUTO: 97.6 FL (ref 80–100)
NRBC BLD QL AUTO: 0.2 %
POTASSIUM SERPL-SCNC: 3.9 MMOL/L (ref 3.5–5.1)
PROT SERPL-MCNC: 5.3 G/DL (ref 6.4–8.2)
SODIUM SERPL-SCNC: 137 MMOL/L (ref 136–145)

## 2021-11-13 RX ADMIN — Medication SCH MG: at 22:27

## 2021-11-13 RX ADMIN — Medication SCH ML: at 06:00

## 2021-11-13 RX ADMIN — Medication SCH MG: at 06:00

## 2021-11-13 RX ADMIN — FOLIC ACID SCH MG: 1 TABLET ORAL at 09:16

## 2021-11-13 RX ADMIN — SODIUM CHLORIDE SCH MCG: 9 INJECTION, SOLUTION INTRAVENOUS at 06:00

## 2021-11-13 RX ADMIN — MIDODRINE HYDROCHLORIDE SCH MG: 10 TABLET ORAL at 06:00

## 2021-11-13 RX ADMIN — DEXTROSE SCH MLS/HR: 5 SOLUTION INTRAVENOUS at 14:04

## 2021-11-13 RX ADMIN — MIDODRINE HYDROCHLORIDE SCH MG: 10 TABLET ORAL at 11:53

## 2021-11-13 RX ADMIN — DEXTROSE SCH MLS/HR: 5 SOLUTION INTRAVENOUS at 04:15

## 2021-11-13 RX ADMIN — Medication SCH ML: at 18:00

## 2021-11-13 RX ADMIN — Medication SCH MG: at 09:16

## 2021-11-13 RX ADMIN — CEFTRIAXONE SODIUM SCH MLS/HR: 1 INJECTION, POWDER, FOR SOLUTION INTRAMUSCULAR; INTRAVENOUS at 09:16

## 2021-11-13 RX ADMIN — SODIUM CHLORIDE SCH MCG: 9 INJECTION, SOLUTION INTRAVENOUS at 14:22

## 2021-11-13 RX ADMIN — DOPAMINE HYDROCHLORIDE IN DEXTROSE SCH MLS/HR: 1.6 INJECTION, SOLUTION INTRAVENOUS at 13:00

## 2021-11-13 RX ADMIN — Medication SCH ML: at 11:53

## 2021-11-13 RX ADMIN — DEXTROSE SCH MLS/HR: 5 SOLUTION INTRAVENOUS at 23:14

## 2021-11-13 RX ADMIN — PANTOPRAZOLE SODIUM SCH MG: 40 INJECTION, POWDER, FOR SOLUTION INTRAVENOUS at 09:16

## 2021-11-13 RX ADMIN — SODIUM CHLORIDE SCH MCG: 9 INJECTION, SOLUTION INTRAVENOUS at 22:28

## 2021-11-13 RX ADMIN — MIDODRINE HYDROCHLORIDE SCH MG: 10 TABLET ORAL at 18:00

## 2021-11-13 RX ADMIN — Medication SCH MG: at 14:21

## 2021-11-14 VITALS — DIASTOLIC BLOOD PRESSURE: 65 MMHG | SYSTOLIC BLOOD PRESSURE: 102 MMHG

## 2021-11-14 VITALS — SYSTOLIC BLOOD PRESSURE: 95 MMHG | DIASTOLIC BLOOD PRESSURE: 61 MMHG

## 2021-11-14 VITALS — DIASTOLIC BLOOD PRESSURE: 65 MMHG | SYSTOLIC BLOOD PRESSURE: 99 MMHG

## 2021-11-14 VITALS — SYSTOLIC BLOOD PRESSURE: 91 MMHG | DIASTOLIC BLOOD PRESSURE: 66 MMHG

## 2021-11-14 VITALS — SYSTOLIC BLOOD PRESSURE: 91 MMHG | DIASTOLIC BLOOD PRESSURE: 58 MMHG

## 2021-11-14 LAB
ANION GAP SERPL CALCULATED.3IONS-SCNC: 9 MMOL/L (ref 5–15)
BUN SERPL-MCNC: 50 MG/DL (ref 7–18)
BUN/CREAT SERPL: 19.3
CALCIUM SERPL-MCNC: 7.7 MG/DL (ref 8.5–10.1)
CHLORIDE SERPL-SCNC: 105 MMOL/L (ref 98–107)
CO2 SERPL-SCNC: 24 MMOL/L (ref 21–32)
GLUCOSE SERPL-MCNC: 88 MG/DL (ref 74–106)
POTASSIUM SERPL-SCNC: 3.8 MMOL/L (ref 3.5–5.1)
SODIUM SERPL-SCNC: 138 MMOL/L (ref 136–145)

## 2021-11-14 RX ADMIN — DEXTROSE SCH MLS/HR: 5 SOLUTION INTRAVENOUS at 09:38

## 2021-11-14 RX ADMIN — MIDODRINE HYDROCHLORIDE SCH MG: 10 TABLET ORAL at 18:00

## 2021-11-14 RX ADMIN — SODIUM CHLORIDE SCH MCG: 9 INJECTION, SOLUTION INTRAVENOUS at 14:00

## 2021-11-14 RX ADMIN — Medication SCH ML: at 18:00

## 2021-11-14 RX ADMIN — MIDODRINE HYDROCHLORIDE SCH MG: 10 TABLET ORAL at 12:00

## 2021-11-14 RX ADMIN — Medication SCH MG: at 09:38

## 2021-11-14 RX ADMIN — ERTAPENEM SODIUM SCH MLS/HR: 1 INJECTION, POWDER, LYOPHILIZED, FOR SOLUTION INTRAMUSCULAR; INTRAVENOUS at 09:38

## 2021-11-14 RX ADMIN — SODIUM CHLORIDE SCH MCG: 9 INJECTION, SOLUTION INTRAVENOUS at 22:05

## 2021-11-14 RX ADMIN — Medication SCH MG: at 05:56

## 2021-11-14 RX ADMIN — Medication SCH ML: at 00:00

## 2021-11-14 RX ADMIN — FOLIC ACID SCH MG: 1 TABLET ORAL at 09:38

## 2021-11-14 RX ADMIN — MIDODRINE HYDROCHLORIDE SCH MG: 10 TABLET ORAL at 05:56

## 2021-11-14 RX ADMIN — Medication SCH ML: at 05:56

## 2021-11-14 RX ADMIN — Medication SCH MG: at 14:00

## 2021-11-14 RX ADMIN — Medication SCH ML: at 23:58

## 2021-11-14 RX ADMIN — PANTOPRAZOLE SODIUM SCH MG: 40 INJECTION, POWDER, FOR SOLUTION INTRAVENOUS at 09:38

## 2021-11-14 RX ADMIN — Medication SCH ML: at 06:00

## 2021-11-14 RX ADMIN — Medication SCH MG: at 22:03

## 2021-11-14 RX ADMIN — SODIUM CHLORIDE SCH MCG: 9 INJECTION, SOLUTION INTRAVENOUS at 05:57

## 2021-11-15 VITALS — SYSTOLIC BLOOD PRESSURE: 96 MMHG | DIASTOLIC BLOOD PRESSURE: 60 MMHG

## 2021-11-15 VITALS — SYSTOLIC BLOOD PRESSURE: 103 MMHG | DIASTOLIC BLOOD PRESSURE: 72 MMHG

## 2021-11-15 VITALS — DIASTOLIC BLOOD PRESSURE: 60 MMHG | SYSTOLIC BLOOD PRESSURE: 101 MMHG

## 2021-11-15 VITALS — SYSTOLIC BLOOD PRESSURE: 97 MMHG | DIASTOLIC BLOOD PRESSURE: 59 MMHG

## 2021-11-15 RX ADMIN — Medication SCH MG: at 09:55

## 2021-11-15 RX ADMIN — FOLIC ACID SCH MG: 1 TABLET ORAL at 09:55

## 2021-11-15 RX ADMIN — Medication SCH ML: at 06:05

## 2021-11-15 RX ADMIN — ERTAPENEM SODIUM SCH MLS/HR: 1 INJECTION, POWDER, LYOPHILIZED, FOR SOLUTION INTRAMUSCULAR; INTRAVENOUS at 09:54

## 2021-11-15 RX ADMIN — Medication SCH ML: at 12:43

## 2021-11-15 RX ADMIN — MIDODRINE HYDROCHLORIDE SCH MG: 10 TABLET ORAL at 12:43

## 2021-11-15 RX ADMIN — Medication SCH MG: at 06:05

## 2021-11-15 RX ADMIN — MIDODRINE HYDROCHLORIDE SCH MG: 10 TABLET ORAL at 06:06

## 2021-11-15 RX ADMIN — Medication SCH MG: at 14:00

## 2021-11-15 RX ADMIN — PANTOPRAZOLE SODIUM SCH MG: 40 INJECTION, POWDER, FOR SOLUTION INTRAVENOUS at 09:55

## 2021-11-15 RX ADMIN — MIDODRINE HYDROCHLORIDE SCH MG: 10 TABLET ORAL at 18:00

## 2021-11-15 RX ADMIN — Medication SCH ML: at 18:00

## 2021-11-15 RX ADMIN — SODIUM CHLORIDE SCH MCG: 9 INJECTION, SOLUTION INTRAVENOUS at 14:00

## 2021-11-15 RX ADMIN — SODIUM CHLORIDE SCH MCG: 9 INJECTION, SOLUTION INTRAVENOUS at 06:06
